# Patient Record
Sex: FEMALE | Race: WHITE | NOT HISPANIC OR LATINO | Employment: PART TIME | URBAN - METROPOLITAN AREA
[De-identification: names, ages, dates, MRNs, and addresses within clinical notes are randomized per-mention and may not be internally consistent; named-entity substitution may affect disease eponyms.]

---

## 2020-12-07 ENCOUNTER — INITIAL PRENATAL (OUTPATIENT)
Dept: OBGYN CLINIC | Facility: CLINIC | Age: 40
End: 2020-12-07

## 2020-12-07 VITALS
WEIGHT: 156 LBS | SYSTOLIC BLOOD PRESSURE: 122 MMHG | RESPIRATION RATE: 16 BRPM | HEART RATE: 88 BPM | DIASTOLIC BLOOD PRESSURE: 70 MMHG | HEIGHT: 65 IN | BODY MASS INDEX: 25.99 KG/M2

## 2020-12-07 DIAGNOSIS — Z34.81 ENCOUNTER FOR SUPERVISION OF OTHER NORMAL PREGNANCY IN FIRST TRIMESTER: Primary | ICD-10-CM

## 2020-12-07 DIAGNOSIS — Z3A.09 9 WEEKS GESTATION OF PREGNANCY: ICD-10-CM

## 2020-12-07 DIAGNOSIS — O09.521 AMA (ADVANCED MATERNAL AGE) MULTIGRAVIDA 35+, FIRST TRIMESTER: ICD-10-CM

## 2020-12-07 PROCEDURE — NOBC: Performed by: OBSTETRICS & GYNECOLOGY

## 2020-12-09 ENCOUNTER — TRANSCRIBE ORDERS (OUTPATIENT)
Dept: LAB | Facility: CLINIC | Age: 40
End: 2020-12-09

## 2020-12-09 ENCOUNTER — LAB (OUTPATIENT)
Dept: LAB | Facility: CLINIC | Age: 40
End: 2020-12-09
Payer: COMMERCIAL

## 2020-12-09 DIAGNOSIS — Z3A.09 9 WEEKS GESTATION OF PREGNANCY: ICD-10-CM

## 2020-12-09 DIAGNOSIS — O09.521 AMA (ADVANCED MATERNAL AGE) MULTIGRAVIDA 35+, FIRST TRIMESTER: ICD-10-CM

## 2020-12-09 DIAGNOSIS — Z34.81 ENCOUNTER FOR SUPERVISION OF OTHER NORMAL PREGNANCY IN FIRST TRIMESTER: ICD-10-CM

## 2020-12-09 LAB
ABO GROUP BLD: NORMAL
BASOPHILS # BLD AUTO: 0.06 THOUSANDS/ΜL (ref 0–0.1)
BASOPHILS NFR BLD AUTO: 1 % (ref 0–1)
BILIRUB UR QL STRIP: NEGATIVE
BLD GP AB SCN SERPL QL: NEGATIVE
CLARITY UR: CLEAR
COLOR UR: YELLOW
EOSINOPHIL # BLD AUTO: 0.09 THOUSAND/ΜL (ref 0–0.61)
EOSINOPHIL NFR BLD AUTO: 1 % (ref 0–6)
ERYTHROCYTE [DISTWIDTH] IN BLOOD BY AUTOMATED COUNT: 13.4 % (ref 11.6–15.1)
EST. AVERAGE GLUCOSE BLD GHB EST-MCNC: 94 MG/DL
GLUCOSE P FAST SERPL-MCNC: 85 MG/DL (ref 65–99)
GLUCOSE UR STRIP-MCNC: NEGATIVE MG/DL
HBA1C MFR BLD: 4.9 %
HBV SURFACE AG SER QL: NORMAL
HCT VFR BLD AUTO: 38.5 % (ref 34.8–46.1)
HCV AB SER QL: NORMAL
HGB BLD-MCNC: 12.6 G/DL (ref 11.5–15.4)
HGB UR QL STRIP.AUTO: NEGATIVE
IMM GRANULOCYTES # BLD AUTO: 0.03 THOUSAND/UL (ref 0–0.2)
IMM GRANULOCYTES NFR BLD AUTO: 0 % (ref 0–2)
KETONES UR STRIP-MCNC: NEGATIVE MG/DL
LEUKOCYTE ESTERASE UR QL STRIP: NEGATIVE
LYMPHOCYTES # BLD AUTO: 1.56 THOUSANDS/ΜL (ref 0.6–4.47)
LYMPHOCYTES NFR BLD AUTO: 21 % (ref 14–44)
MCH RBC QN AUTO: 31.5 PG (ref 26.8–34.3)
MCHC RBC AUTO-ENTMCNC: 32.7 G/DL (ref 31.4–37.4)
MCV RBC AUTO: 96 FL (ref 82–98)
MONOCYTES # BLD AUTO: 0.66 THOUSAND/ΜL (ref 0.17–1.22)
MONOCYTES NFR BLD AUTO: 9 % (ref 4–12)
NEUTROPHILS # BLD AUTO: 5.19 THOUSANDS/ΜL (ref 1.85–7.62)
NEUTS SEG NFR BLD AUTO: 68 % (ref 43–75)
NITRITE UR QL STRIP: NEGATIVE
NRBC BLD AUTO-RTO: 0 /100 WBCS
PH UR STRIP.AUTO: 7 [PH]
PLATELET # BLD AUTO: 338 THOUSANDS/UL (ref 149–390)
PMV BLD AUTO: 8.8 FL (ref 8.9–12.7)
PROT UR STRIP-MCNC: NEGATIVE MG/DL
RBC # BLD AUTO: 4 MILLION/UL (ref 3.81–5.12)
RH BLD: NEGATIVE
RPR SER QL: NORMAL
RUBV IGG SERPL IA-ACNC: 159.3 IU/ML
SP GR UR STRIP.AUTO: 1.02 (ref 1–1.03)
SPECIMEN EXPIRATION DATE: NORMAL
UROBILINOGEN UR QL STRIP.AUTO: 0.2 E.U./DL
WBC # BLD AUTO: 7.59 THOUSAND/UL (ref 4.31–10.16)

## 2020-12-09 PROCEDURE — 82947 ASSAY GLUCOSE BLOOD QUANT: CPT

## 2020-12-09 PROCEDURE — 80081 OBSTETRIC PANEL INC HIV TSTG: CPT

## 2020-12-09 PROCEDURE — 86803 HEPATITIS C AB TEST: CPT

## 2020-12-09 PROCEDURE — 36415 COLL VENOUS BLD VENIPUNCTURE: CPT

## 2020-12-09 PROCEDURE — 86787 VARICELLA-ZOSTER ANTIBODY: CPT

## 2020-12-09 PROCEDURE — 87086 URINE CULTURE/COLONY COUNT: CPT

## 2020-12-09 PROCEDURE — 81003 URINALYSIS AUTO W/O SCOPE: CPT

## 2020-12-09 PROCEDURE — 83036 HEMOGLOBIN GLYCOSYLATED A1C: CPT

## 2020-12-10 LAB
BACTERIA UR CULT: NORMAL
HIV 1+2 AB+HIV1 P24 AG SERPL QL IA: NORMAL
VZV IGG SER IA-ACNC: NORMAL

## 2020-12-11 ENCOUNTER — HOSPITAL ENCOUNTER (OUTPATIENT)
Dept: ULTRASOUND IMAGING | Facility: HOSPITAL | Age: 40
Discharge: HOME | End: 2020-12-11
Attending: OBSTETRICS & GYNECOLOGY
Payer: COMMERCIAL

## 2020-12-11 DIAGNOSIS — O09.521 AMA (ADVANCED MATERNAL AGE) MULTIGRAVIDA 35+, FIRST TRIMESTER: ICD-10-CM

## 2020-12-11 DIAGNOSIS — Z34.81 ENCOUNTER FOR SUPERVISION OF OTHER NORMAL PREGNANCY IN FIRST TRIMESTER: ICD-10-CM

## 2020-12-11 DIAGNOSIS — Z3A.09 9 WEEKS GESTATION OF PREGNANCY: ICD-10-CM

## 2020-12-11 PROCEDURE — 76801 OB US < 14 WKS SINGLE FETUS: CPT

## 2020-12-14 ENCOUNTER — TELEPHONE (OUTPATIENT)
Dept: OBGYN CLINIC | Facility: CLINIC | Age: 40
End: 2020-12-14

## 2020-12-14 DIAGNOSIS — O09.521 AMA (ADVANCED MATERNAL AGE) MULTIGRAVIDA 35+, FIRST TRIMESTER: ICD-10-CM

## 2020-12-14 DIAGNOSIS — Z3A.09 9 WEEKS GESTATION OF PREGNANCY: ICD-10-CM

## 2020-12-14 DIAGNOSIS — Z34.81 ENCOUNTER FOR SUPERVISION OF OTHER NORMAL PREGNANCY IN FIRST TRIMESTER: Primary | ICD-10-CM

## 2020-12-17 ENCOUNTER — TELEPHONE (OUTPATIENT)
Dept: PERINATAL CARE | Facility: CLINIC | Age: 40
End: 2020-12-17

## 2020-12-18 ENCOUNTER — TELEMEDICINE (OUTPATIENT)
Dept: PERINATAL CARE | Facility: CLINIC | Age: 40
End: 2020-12-18

## 2020-12-18 ENCOUNTER — TELEPHONE (OUTPATIENT)
Dept: PERINATAL CARE | Facility: OTHER | Age: 40
End: 2020-12-18

## 2020-12-18 ENCOUNTER — DOCUMENTATION (OUTPATIENT)
Dept: PERINATAL CARE | Facility: OTHER | Age: 40
End: 2020-12-18

## 2020-12-18 ENCOUNTER — TRANSCRIBE ORDERS (OUTPATIENT)
Dept: LAB | Facility: CLINIC | Age: 40
End: 2020-12-18

## 2020-12-18 ENCOUNTER — LAB (OUTPATIENT)
Dept: LAB | Facility: CLINIC | Age: 40
End: 2020-12-18
Payer: COMMERCIAL

## 2020-12-18 DIAGNOSIS — Z31.5 ENCOUNTER FOR PROCREATIVE GENETIC COUNSELING: ICD-10-CM

## 2020-12-18 DIAGNOSIS — O09.521 SUPERVISION OF ELDERLY MULTIGRAVIDA IN FIRST TRIMESTER: ICD-10-CM

## 2020-12-18 DIAGNOSIS — O09.521 SUPERVISION OF ELDERLY MULTIGRAVIDA IN FIRST TRIMESTER: Primary | ICD-10-CM

## 2020-12-18 DIAGNOSIS — O09.529 ANTEPARTUM MULTIGRAVIDA OF ADVANCED MATERNAL AGE: Primary | ICD-10-CM

## 2020-12-18 PROCEDURE — 36415 COLL VENOUS BLD VENIPUNCTURE: CPT

## 2020-12-21 LAB — MISCELLANEOUS LAB TEST RESULT: NORMAL

## 2020-12-22 NOTE — PROGRESS NOTES
Genetic Counseling   High-Risk Gestation Note    Appointment Date:  2020  Referred By: No ref  provider found  YOB: 1980  Partner:  Darren Webb  Indication for Visit:  advanced maternal age  Pregnancy History:   Estimated Date of Delivery: 21  Estimated Gestational Age: 10w1d      Virtual Regular Visit      Assessment/Plan:    Problem List Items Addressed This Visit     None      Visit Diagnoses     Antepartum multigravida of advanced maternal age    -  Primary    Encounter for procreative genetic counseling                   Reason for visit is   Chief Complaint   Patient presents with    Virtual Regular Visit        Encounter provider Anthony Fung    Provider located at [address] [place] [id number] [phone]      Recent Visits  Date Type Provider Dept   20 Telephone [facility]   Showing recent visits within past 7 days and meeting all other requirements     Future Appointments  No visits were found meeting these conditions  Showing future appointments within next 150 days and meeting all other requirements        The patient was identified by name and date of birth  Alisa Burgosmarshall was informed that this is a telemedicine visit and that the visit is being conducted through [facility] and patient was informed that this is a secure, HIPAA-compliant platform  She agrees to proceed     My office door was closed  No one else was in the room  She acknowledged consent and understanding of privacy and security of the video platform  The patient has agreed to participate and understands they can discontinue the visit at any time  Patient is aware this is a billable service  Shady Ramirez is a 45 y o  female who presented for genetic counseling to discuss maternal age related risks for chromosome abnormalities    The risk of Down syndrome at age 45 at delivery is 1/125, and the risk for any chromosomal abnormality at this age is   The differences between full chromosome aneuploidies and copy number variants (microdeletions and microduplications) was also discussed  We reviewed that copy number variants occur in about 0 4% of all pregnancies  The risks, benefits, and limitations of amniocentesis were discussed with the patient  Amniocentesis is performed under direct real time ultrasound visualization to avoid both the fetus and the placenta  Once amniotic fluid is withdrawn, laboratory analysis is performed and amniotic fluid alpha-fetoprotein, as well as chromosome and/or microarray analysis is undertaken  The risk of genetic amniocentesis includes, but is not limited to less than 1 in 300 pregnancy loss rate or  delivery rate if 23 weeks or greater, infection, bleeding, rupture of membranes, failure of cells to grow, karyotype error, laboratory error, etc   Occasionally a repeat amniocentesis is necessary due to cell culture failure  Chromosome/microarray analysis from amniocentesis is 99 9% accurate and alpha-fetoprotein analysis can detect approximately 95% of open neural tube defects  Chorionic villus sampling (CVS) is another diagnostic testing option that is available earlier than amniocentesis, between 10-14 weeks gestation  Like amniocentesis, CVS is 99% accurate for detecting chromosomal problems  Unlike amniocentesis, CVS cannot detect alpha-fetoprotein levels in order to determine the risk for open neural tube defects  MSAFP testing would need to be performed at 15-20 weeks gestation for this purpose  The risk of CVS includes, but is not limited to, less than a 1 in 300 risk for pregnancy loss  There is also a 1% risk for maternal cell contamination and cell culture failure, in which case the CVS would need to be followed-up with amniocentesis      We reviewed the testing option of cell free fetal DNA screening (also known as noninvasive prenatal testing or NIPT)  We discussed that it is a serum test to identify fragments of fetal DNA in maternal blood  We reviewed the benefits and limitations of cell free fetal DNA screening in detecting Down syndrome, Trisomy 13, Trisomy 25 and sex chromosome aneuploidies  We also discussed that cell free fetal DNA screening does not detect additional chromosomal abnormalities and the possibility of a failed test result  As cell free fetal DNA screening does not detect open neural tube defects, MSAFP screening is available at 15-20 weeks gestation  We discussed the availability of an ultrasound between 11-14 weeks gestation to measure the nuchal translucency (NT), which can assess for chromosome abnormalities, cardiac defects, and other adverse pregnancy outcomes  We reviewed that level II anatomy ultrasound is typically performed at approximately 20 weeks gestation  Level II ultrasound evaluation is between 60-80% accurate in detecting major physical birth defects and variations in fetal development that may be associated with chromosome abnormalities  Level II ultrasound evaluation is not able to detect all birth defects or health problems  After discussing the available prenatal screening and testing options Branda Dancer elected to pursue cell free fetal DNA screening  She was informed that the results will disclose the fetal sex and will be available in her MyChart to review  She opted to  a [address] lab slip and kit at our [place] [address] location, to be drawn at the hospital lab  Results take approximately 7-10 days  The [id number] cost estimate information was also provided to the patient and she was encouraged to find out how much it would be prior to getting her blood drawn  The maximum out of pocket cost of $299 through the Every Mom Pledge program was also discussed with the patient  The patient declined CVS and amniocentesis secondary to procedural related complications    She may reconsider diagnostic testing should the cell free fetal DNA screening come back abnormal   Dajuan Gatica is also planning on pursuing NT ultrasound, MSAFP screening and Level II ultrasound at the appropriate times  Histories for the patient and her partner's family were taken during our session and was noncontributory  The family history was not significant for genetic diseases or disorders, intellectual disability, birth defects, fetal loss, or consanguinity  Patient reports being of [place] citizen[place] decent and that her partner is of   decent  She denies either of them having known Ashkenazi Mu-ism ancestry  The benefits and limitations of Cystic fibrosis (CF), Spinal muscular atrophy (SMA), hemoglobinopathy, Fragile X, and expanded carrier screening was discussed  The patient declined expanded carrier screening, but elected to pursue the others pending insurance approval   She will be contacted for her blood draw once approval is obtained  Lastly, we discussed the fact that everyone in the general population regardless of age, family history, or medical background has approximately a 3-5% risk of having a child with some type of congenital anomaly, genetic disease or intellectual disability  Currently there are no tests available to rule out all birth defects or health problems  Dajuan Gatica was provided with our contact information  I encouraged her to call with any questions or concerns  Plan/Tests Ordered:  1) Patient declined CVS, amniocentesis, and expanded carrier screening  2) Patient elected NIPT - will  Broderick Navarro and haylee at [facility]  3) Patient elected CF, SMA, hemoglobinopathy, and Fragile X carrier screening pending insurance approval   4) NT ultrasound scheduled for 1/6/21  5) MSAFP screening at 15-20 weeks gestation  5) Level II anatomy ultrasound at approximately 20 weeks gestation          HPI     Past Medical History:   Diagnosis Date    Abnormal Pap smear of cervix     ADHD     Anxiety     Herpes     HPV (human papilloma virus) infection     Rh incompatibility     Urinary tract infection        Past Surgical History:   Procedure Laterality Date     SECTION      COLPOSCOPY         Current Outpatient Medications   Medication Sig Dispense Refill    Prenatal MV-Min-Fe Fum-FA-DHA (Prenatal Multi +DHA) 27-0 8-200 MG CAPS Take 1 tablet by mouth daily       No current facility-administered medications for this visit  No Known Allergies    Review of Systems    Video Exam    There were no vitals filed for this visit  Physical Exam     I spent 60 minutes with patient today in which greater than 50% of the time was spent in counseling/coordination of care regarding the above  VIRTUAL VISIT DISCLAIMER    Tanner March acknowledges that she has consented to an online visit or consultation  She understands that the online visit is based solely on information provided by her, and that, in the absence of a face-to-face physical evaluation by the physician, the diagnosis she receives is both limited and provisional in terms of accuracy and completeness  This is not intended to replace a full medical face-to-face evaluation by the physician  Tanner Laguna understands and accepts these terms

## 2020-12-24 ENCOUNTER — ROUTINE PRENATAL (OUTPATIENT)
Dept: OBGYN CLINIC | Facility: CLINIC | Age: 40
End: 2020-12-24
Payer: COMMERCIAL

## 2020-12-24 VITALS — SYSTOLIC BLOOD PRESSURE: 118 MMHG | DIASTOLIC BLOOD PRESSURE: 68 MMHG | WEIGHT: 157 LBS | BODY MASS INDEX: 26.13 KG/M2

## 2020-12-24 DIAGNOSIS — Z34.81 ENCOUNTER FOR SUPERVISION OF OTHER NORMAL PREGNANCY IN FIRST TRIMESTER: Primary | ICD-10-CM

## 2020-12-24 DIAGNOSIS — O09.521 MULTIGRAVIDA OF ADVANCED MATERNAL AGE IN FIRST TRIMESTER: ICD-10-CM

## 2020-12-24 DIAGNOSIS — Z11.3 SCREENING FOR STD (SEXUALLY TRANSMITTED DISEASE): ICD-10-CM

## 2020-12-24 DIAGNOSIS — O26.899 RH NEGATIVE STATE IN ANTEPARTUM PERIOD: ICD-10-CM

## 2020-12-24 DIAGNOSIS — L70.9 ACNE, UNSPECIFIED ACNE TYPE: ICD-10-CM

## 2020-12-24 DIAGNOSIS — Z67.91 RH NEGATIVE STATE IN ANTEPARTUM PERIOD: ICD-10-CM

## 2020-12-24 DIAGNOSIS — O34.219 HX SUCCESSFUL VBAC (VAGINAL BIRTH AFTER CESAREAN), CURRENTLY PREGNANT: ICD-10-CM

## 2020-12-24 DIAGNOSIS — O34.219 PATIENT DESIRES VAGINAL BIRTH AFTER CESAREAN SECTION (VBAC): ICD-10-CM

## 2020-12-24 DIAGNOSIS — Z34.81 PRENATAL CARE, SUBSEQUENT PREGNANCY IN FIRST TRIMESTER: ICD-10-CM

## 2020-12-24 PROCEDURE — 99212 OFFICE O/P EST SF 10 MIN: CPT | Performed by: OBSTETRICS & GYNECOLOGY

## 2020-12-24 PROCEDURE — 87491 CHLMYD TRACH DNA AMP PROBE: CPT | Performed by: OBSTETRICS & GYNECOLOGY

## 2020-12-24 PROCEDURE — 87591 N.GONORRHOEAE DNA AMP PROB: CPT | Performed by: OBSTETRICS & GYNECOLOGY

## 2020-12-24 RX ORDER — ERYTHROMYCIN BASE IN ETHANOL 2 %
1 SWAB, MEDICATED TOPICAL DAILY
Qty: 60 EACH | Refills: 11 | Status: SHIPPED | OUTPATIENT
Start: 2020-12-24 | End: 2021-08-06

## 2020-12-25 LAB
C TRACH DNA SPEC QL NAA+PROBE: NEGATIVE
N GONORRHOEA DNA SPEC QL NAA+PROBE: NEGATIVE

## 2021-01-05 ENCOUNTER — TELEPHONE (OUTPATIENT)
Dept: PERINATAL CARE | Facility: CLINIC | Age: 41
End: 2021-01-05

## 2021-01-05 NOTE — TELEPHONE ENCOUNTER
Spoke with patient and confirmed appointment with [facility]  1 support person ( must be over age of 15) may accompany patient  Will you and your support person be able to wear a mask ,without a valve , during entire appointment? YES   To minimize your exposure in our waiting area,check in and rooming questions will be done via phone  When you arrive in the parking lot please call the following inside line # prior to entering office:    Ayana Preciado: [phone]    Have you or your support person traveled outside the state in the last 2 weeks? NO  If yes, what state did you travel to? Do you or your support person have:  Fever or flu- like symptoms? NO  Symptoms of upper respiratory infection like runny nose, sore throat or cough? NO  Do you have new headache that you have not had in the past?NO  Have you experienced any new shortness of breath recently? NO  Do you have any new loss of taste or smell? NO  Do you have any new diarrhea, nausea or vomiting? NO  Have you recently been in contact with anyone who has been sick or diagnosed with COVID-19 infection? NO  Have you been recommended to quarantine because of an exposure to a confirmed positive COVID19 person? NO  Have you recently been tested for COVID19? NO    Patient verbalized understanding of all instructions   -------------------------------------------------------------

## 2021-01-06 ENCOUNTER — ROUTINE PRENATAL (OUTPATIENT)
Dept: PERINATAL CARE | Facility: CLINIC | Age: 41
End: 2021-01-06
Payer: COMMERCIAL

## 2021-01-06 VITALS
WEIGHT: 160.6 LBS | HEART RATE: 90 BPM | DIASTOLIC BLOOD PRESSURE: 72 MMHG | HEIGHT: 65 IN | BODY MASS INDEX: 26.76 KG/M2 | SYSTOLIC BLOOD PRESSURE: 124 MMHG

## 2021-01-06 DIAGNOSIS — Z98.891 HISTORY OF CESAREAN SECTION: ICD-10-CM

## 2021-01-06 DIAGNOSIS — O09.521 ELDERLY MULTIGRAVIDA, FIRST TRIMESTER: Primary | ICD-10-CM

## 2021-01-06 DIAGNOSIS — O34.11 UTERINE FIBROIDS AFFECTING PREGNANCY, ANTEPARTUM, FIRST TRIMESTER: ICD-10-CM

## 2021-01-06 DIAGNOSIS — Z3A.13 13 WEEKS GESTATION OF PREGNANCY: ICD-10-CM

## 2021-01-06 DIAGNOSIS — D25.9 UTERINE FIBROIDS AFFECTING PREGNANCY, ANTEPARTUM, FIRST TRIMESTER: ICD-10-CM

## 2021-01-06 PROCEDURE — 99214 OFFICE O/P EST MOD 30 MIN: CPT | Performed by: OBSTETRICS & GYNECOLOGY

## 2021-01-06 PROCEDURE — 76801 OB US < 14 WKS SINGLE FETUS: CPT | Performed by: OBSTETRICS & GYNECOLOGY

## 2021-01-06 PROCEDURE — 76813 OB US NUCHAL MEAS 1 GEST: CPT | Performed by: OBSTETRICS & GYNECOLOGY

## 2021-01-06 RX ORDER — ASPIRIN 81 MG/1
162 TABLET, CHEWABLE ORAL DAILY
Qty: 180 TABLET | Refills: 1 | Status: SHIPPED | OUTPATIENT
Start: 2021-01-06 | End: 2021-07-08 | Stop reason: HOSPADM

## 2021-01-06 NOTE — LETTER
January 9, 6169     [facility], DO  [address]  [address]  [place] [address]    Patient: Juan Jose Prince   YOB: 1980   Date of Visit: 1/6/2021       Dear Dr Sampson Castellano:    Thank you for referring Aysha Cardenas to me for evaluation  Below are my notes for this consultation  If you have questions, please do not hesitate to call me  I look forward to following your patient along with you  Sincerely,        Bryant Enamorado MD        CC: No Recipients  Bryant Enamorado MD  1/9/2021 10:54 AM  Sign when Signing Visit  Please refer to the [facility] ultrasound report in Ob Procedures for additional information regarding today's visit

## 2021-01-09 PROBLEM — O09.521 ELDERLY MULTIGRAVIDA, FIRST TRIMESTER: Status: ACTIVE | Noted: 2021-01-09

## 2021-01-09 PROBLEM — O34.11 UTERINE FIBROIDS AFFECTING PREGNANCY, ANTEPARTUM, FIRST TRIMESTER: Status: ACTIVE | Noted: 2021-01-09

## 2021-01-09 PROBLEM — D25.9 UTERINE FIBROIDS AFFECTING PREGNANCY, ANTEPARTUM, FIRST TRIMESTER: Status: ACTIVE | Noted: 2021-01-09

## 2021-01-09 PROBLEM — Z98.891 HISTORY OF CESAREAN SECTION: Status: ACTIVE | Noted: 2021-01-09

## 2021-01-09 NOTE — PROGRESS NOTES
Please refer to the [facility] ultrasound report in Ob Procedures for additional information regarding today's visit

## 2021-01-20 ENCOUNTER — ROUTINE PRENATAL (OUTPATIENT)
Dept: OBGYN CLINIC | Facility: CLINIC | Age: 41
End: 2021-01-20
Payer: COMMERCIAL

## 2021-01-20 VITALS — SYSTOLIC BLOOD PRESSURE: 122 MMHG | BODY MASS INDEX: 26.99 KG/M2 | WEIGHT: 162.2 LBS | DIASTOLIC BLOOD PRESSURE: 64 MMHG

## 2021-01-20 DIAGNOSIS — Z34.82 ENCOUNTER FOR SUPERVISION OF OTHER NORMAL PREGNANCY IN SECOND TRIMESTER: Primary | ICD-10-CM

## 2021-01-20 DIAGNOSIS — Z3A.15 15 WEEKS GESTATION OF PREGNANCY: ICD-10-CM

## 2021-01-20 DIAGNOSIS — Z34.81 ENCOUNTER FOR SUPERVISION OF OTHER NORMAL PREGNANCY IN FIRST TRIMESTER: ICD-10-CM

## 2021-01-20 PROBLEM — Z34.90 SUPERVISION OF NORMAL PREGNANCY: Status: ACTIVE | Noted: 2021-01-20

## 2021-01-20 PROBLEM — Z34.92 ENCOUNTER FOR SUPERVISION OF NORMAL PREGNANCY IN SECOND TRIMESTER: Status: ACTIVE | Noted: 2021-01-20

## 2021-01-20 PROCEDURE — 99213 OFFICE O/P EST LOW 20 MIN: CPT | Performed by: OBSTETRICS & GYNECOLOGY

## 2021-01-20 RX ORDER — DOCUSATE SODIUM 250 MG
250 CAPSULE ORAL DAILY
COMMUNITY
End: 2021-08-06

## 2021-01-20 NOTE — PROGRESS NOTES
45 y o  [id number]  female at 16 4 wga EGA for PNV  BP : 122/64  TW  Feeling well  No complaints  AFP ordered  Level 2 scheduled  Prior  section followed by successful  approximately 9 years ago, would like a no other trial of labor  Unsure about contraception    Considering post placental IUD versus sterilization if she were to need a  - will continue to discuss  Follow-up in 4 weeks

## 2021-01-25 ENCOUNTER — DOCUMENTATION (OUTPATIENT)
Dept: PERINATAL CARE | Facility: CLINIC | Age: 41
End: 2021-01-25

## 2021-01-26 ENCOUNTER — TELEPHONE (OUTPATIENT)
Dept: PERINATAL CARE | Facility: CLINIC | Age: 41
End: 2021-01-26

## 2021-01-26 DIAGNOSIS — Z13.71 TESTING OF FEMALE FOR GENETIC DISEASE CARRIER STATUS: Primary | ICD-10-CM

## 2021-01-26 NOTE — TELEPHONE ENCOUNTER
Received notice from patient's insurance that CF, SMA, and Fragile X syndrome carrier screening was approved (Auth # [id number])  Called patient and left message to let her know prior Katelynn Thomas was obtained and she can get her blood drawn at any [address] [place] [address] location  Labslips will be mailed and she will be contacted when results are available  Provided genetic counseling phone number for any questions or concerns

## 2021-02-09 ENCOUNTER — LAB (OUTPATIENT)
Dept: LAB | Facility: AMBULARY SURGERY CENTER | Age: 41
End: 2021-02-09
Attending: OBSTETRICS & GYNECOLOGY
Payer: COMMERCIAL

## 2021-02-09 DIAGNOSIS — Z34.81 ENCOUNTER FOR SUPERVISION OF OTHER NORMAL PREGNANCY IN FIRST TRIMESTER: ICD-10-CM

## 2021-02-09 PROCEDURE — 36415 COLL VENOUS BLD VENIPUNCTURE: CPT

## 2021-02-09 PROCEDURE — 82105 ALPHA-FETOPROTEIN SERUM: CPT

## 2021-02-10 NOTE — RESULT ENCOUNTER NOTE
I have reviewed the labs which are normal in pregnancy  Please contact the patient and notify her of the normal results if she has not reviewed them in Je[facility] Bass     Thank you

## 2021-02-11 LAB
2ND TRIMESTER 4 SCREEN SERPL-IMP: NORMAL
AFP ADJ MOM SERPL: 1.14
AFP INTERP AMN-IMP: NORMAL
AFP INTERP SERPL-IMP: NORMAL
AFP INTERP SERPL-IMP: NORMAL
AFP SERPL-MCNC: 47.8 NG/ML
AGE AT DELIVERY: 39.6 YR
GA METHOD: NORMAL
GA: 18.1 WEEKS
IDDM PATIENT QL: NO
MULTIPLE PREGNANCY: NO
NEURAL TUBE DEFECT RISK FETUS: 7850 %

## 2021-02-12 ENCOUNTER — ROUTINE PRENATAL (OUTPATIENT)
Dept: OBGYN CLINIC | Facility: CLINIC | Age: 41
End: 2021-02-12
Payer: COMMERCIAL

## 2021-02-12 VITALS — BODY MASS INDEX: 26.29 KG/M2 | SYSTOLIC BLOOD PRESSURE: 118 MMHG | DIASTOLIC BLOOD PRESSURE: 64 MMHG | WEIGHT: 158 LBS

## 2021-02-12 DIAGNOSIS — O34.219 PREVIOUS CESAREAN DELIVERY AFFECTING PREGNANCY, ANTEPARTUM: Primary | ICD-10-CM

## 2021-02-12 DIAGNOSIS — Z34.82 ENCOUNTER FOR SUPERVISION OF OTHER NORMAL PREGNANCY IN SECOND TRIMESTER: ICD-10-CM

## 2021-02-12 DIAGNOSIS — Z23 NEED FOR INFLUENZA VACCINATION: ICD-10-CM

## 2021-02-12 DIAGNOSIS — Z3A.18 18 WEEKS GESTATION OF PREGNANCY: ICD-10-CM

## 2021-02-12 PROCEDURE — 90686 IIV4 VACC NO PRSV 0.5 ML IM: CPT | Performed by: OBSTETRICS & GYNECOLOGY

## 2021-02-12 PROCEDURE — 90471 IMMUNIZATION ADMIN: CPT | Performed by: OBSTETRICS & GYNECOLOGY

## 2021-02-12 PROCEDURE — PNV: Performed by: OBSTETRICS & GYNECOLOGY

## 2021-02-12 NOTE — PROGRESS NOTES
45 y o  [id number]  female at 24 2 wga EGA for PNV  BP : 118/64  TWG: 15  Patient is having increased anxiety  She has a past diagnosis of ADHD  She is currently not on medications  She would like to start therapy again  She currently does not have a primary care doctor  Information given to local primary care    Reviewed  labor precautions  Plans to have a trial of labor after   Flu vaccine given today  Follow-up in 4 weeks

## 2021-02-16 LAB
CFTR MUT ANL BLD/T: NORMAL
CFTR MUT ANL BLD/T: NORMAL
CFTR MUT TESTED BLD/T: NORMAL
ERYTHROCYTE [DISTWIDTH] IN BLOOD BY AUTOMATED COUNT: 12 % (ref 11–15)
FMR1 GENE CGG RPT BLD/T QL: NEGATIVE
HCT VFR BLD AUTO: 34.1 % (ref 35–45)
HGB A MFR BLD: 96.5 %
HGB A2 MFR BLD: 2.5 % (ref 1.8–3.5)
HGB BLD-MCNC: 11.6 G/DL (ref 11.7–15.5)
HGB F MFR BLD: <1 %
HGB FRACT BLD-IMP: ABNORMAL
Lab: NORMAL
MCH RBC QN AUTO: 32.9 PG (ref 27–33)
MCV RBC AUTO: 96.6 FL (ref 80–100)
MICRODELETION SYND BLD/T FISH: NORMAL
RBC # BLD AUTO: 3.53 MILLION/UL (ref 3.8–5.1)
REF LAB TEST RESULTS: NORMAL
SL AMB PANEL SUMMARY: NORMAL
SMN1 GENE MUT ANL BLD/T: NORMAL
SMN2 GENE MUT ANL BLD/T: NORMAL

## 2021-02-17 NOTE — RESULT ENCOUNTER NOTE
I have reviewed the labs which are normal in pregnancy  Please contact the patient and notify her of the normal results if she has not reviewed them in Dene[facility] Grace     Thank you

## 2021-02-26 ENCOUNTER — ROUTINE PRENATAL (OUTPATIENT)
Dept: PERINATAL CARE | Facility: OTHER | Age: 41
End: 2021-02-26
Payer: COMMERCIAL

## 2021-02-26 VITALS
DIASTOLIC BLOOD PRESSURE: 70 MMHG | HEART RATE: 88 BPM | WEIGHT: 169.09 LBS | HEIGHT: 65 IN | BODY MASS INDEX: 28.17 KG/M2 | SYSTOLIC BLOOD PRESSURE: 120 MMHG

## 2021-02-26 DIAGNOSIS — Z36.86 ENCOUNTER FOR ANTENATAL SCREENING FOR CERVICAL LENGTH: Primary | ICD-10-CM

## 2021-02-26 DIAGNOSIS — Z3A.20 20 WEEKS GESTATION OF PREGNANCY: ICD-10-CM

## 2021-02-26 DIAGNOSIS — Z36.89 ENCOUNTER FOR FETAL ANATOMIC SURVEY: ICD-10-CM

## 2021-02-26 PROBLEM — O09.522 MULTIGRAVIDA OF ADVANCED MATERNAL AGE IN SECOND TRIMESTER: Status: ACTIVE | Noted: 2020-12-24

## 2021-02-26 PROBLEM — Z98.891 HISTORY OF CESAREAN SECTION: Status: RESOLVED | Noted: 2021-01-09 | Resolved: 2021-02-26

## 2021-02-26 PROBLEM — D25.9 UTERINE FIBROID IN PREGNANCY: Status: ACTIVE | Noted: 2021-02-26

## 2021-02-26 PROBLEM — O34.10 UTERINE FIBROID IN PREGNANCY: Status: ACTIVE | Noted: 2021-02-26

## 2021-02-26 PROBLEM — O09.521 ELDERLY MULTIGRAVIDA, FIRST TRIMESTER: Status: RESOLVED | Noted: 2021-01-09 | Resolved: 2021-02-26

## 2021-02-26 PROCEDURE — 99213 OFFICE O/P EST LOW 20 MIN: CPT | Performed by: OBSTETRICS & GYNECOLOGY

## 2021-02-26 PROCEDURE — 76817 TRANSVAGINAL US OBSTETRIC: CPT | Performed by: OBSTETRICS & GYNECOLOGY

## 2021-02-26 PROCEDURE — 76805 OB US >/= 14 WKS SNGL FETUS: CPT | Performed by: OBSTETRICS & GYNECOLOGY

## 2021-02-26 NOTE — PROGRESS NOTES
[address]: Ms Bob Syed was seen today at 20w4d for anatomic survey and cervical length screening ultrasound  See ultrasound report under "OB Procedures" tab    Please don't hesitate to contact our office with any concerns or questions   -Meagan Khan MD

## 2021-02-26 NOTE — LETTER
2021     DO [address]  [address] [facility]    Patient: Vel Guzmán   YOB: 1980   Date of Visit: 2021       Dear Dr Sienna Cummins:    Thank you for referring Alejo Kirkpatrick to me for evaluation  Below are my notes for this consultation  If you have questions, please do not hesitate to call me  I look forward to following your patient along with you  Sincerely,         US 1 RAUL        CC: No Recipients  Paras Rodriguez MD  2021 10:44 AM  Sign when Signing Visit  [address]: Ms Laure Lombard was seen today at 20w4d for anatomic survey and cervical length screening ultrasound  See ultrasound report under "OB Procedures" tab    Please don't hesitate to contact our office with any concerns or questions   -Paras Rodriguez MD

## 2021-03-10 DIAGNOSIS — Z23 ENCOUNTER FOR IMMUNIZATION: ICD-10-CM

## 2021-03-18 ENCOUNTER — ULTRASOUND (OUTPATIENT)
Dept: PERINATAL CARE | Facility: OTHER | Age: 41
End: 2021-03-18
Payer: COMMERCIAL

## 2021-03-18 ENCOUNTER — ROUTINE PRENATAL (OUTPATIENT)
Dept: OBGYN CLINIC | Facility: CLINIC | Age: 41
End: 2021-03-18

## 2021-03-18 ENCOUNTER — TELEPHONE (OUTPATIENT)
Dept: PERINATAL CARE | Facility: OTHER | Age: 41
End: 2021-03-18

## 2021-03-18 VITALS
SYSTOLIC BLOOD PRESSURE: 122 MMHG | BODY MASS INDEX: 28.92 KG/M2 | HEART RATE: 94 BPM | DIASTOLIC BLOOD PRESSURE: 72 MMHG | WEIGHT: 173.6 LBS | HEIGHT: 65 IN

## 2021-03-18 VITALS — SYSTOLIC BLOOD PRESSURE: 138 MMHG | BODY MASS INDEX: 28.99 KG/M2 | WEIGHT: 174.2 LBS | DIASTOLIC BLOOD PRESSURE: 80 MMHG

## 2021-03-18 DIAGNOSIS — Z3A.23 23 WEEKS GESTATION OF PREGNANCY: Primary | ICD-10-CM

## 2021-03-18 DIAGNOSIS — O09.522 MULTIGRAVIDA OF ADVANCED MATERNAL AGE IN SECOND TRIMESTER: Primary | ICD-10-CM

## 2021-03-18 DIAGNOSIS — Z3A.23 23 WEEKS GESTATION OF PREGNANCY: ICD-10-CM

## 2021-03-18 DIAGNOSIS — O09.522 MULTIGRAVIDA OF ADVANCED MATERNAL AGE IN SECOND TRIMESTER: ICD-10-CM

## 2021-03-18 PROBLEM — Z34.92 ENCOUNTER FOR SUPERVISION OF NORMAL PREGNANCY IN SECOND TRIMESTER: Status: RESOLVED | Noted: 2021-01-20 | Resolved: 2021-03-18

## 2021-03-18 PROCEDURE — 76816 OB US FOLLOW-UP PER FETUS: CPT | Performed by: OBSTETRICS & GYNECOLOGY

## 2021-03-18 PROCEDURE — PNV: Performed by: OBSTETRICS & GYNECOLOGY

## 2021-03-18 PROCEDURE — 99213 OFFICE O/P EST LOW 20 MIN: CPT | Performed by: OBSTETRICS & GYNECOLOGY

## 2021-03-18 NOTE — PROGRESS NOTES
45yo [id number] female at 23w3d EGA presents for a PNV  Initial BP was 144/76 and was repeated for 138/80  TWG 31lbs 3oz  She denies headaches or visual disturbances  She denies cramping, LOF, or vaginal bleeding  She has c/o LUQ abdominal pain x 1 week just above umbilicus with occasional mild nausea  She states nothing makes it worse or better  She also c/o some SOB x 2 weeks, feeling like she cannot fully "expand her lungs"  Cardiac RRR, S1 S2 no murmur  Bilateral lungs CTA b/l  There is no peripheral edema    Abdomen is mildly tender just left of umbilicus  It was recommended to monitor her BP at home and parameters to call office were discussed as well as other s/s to call for  She was also instructed to call office if she develops CP or increasing SOB  She was given lab slip for CBC, RPR and 1 hour Glucose to be completed prior to next visit  She has US scheduled for this afternoon with  for re-evaluation of missed anatomy from previous US    Kayla Spence RN, SNP

## 2021-03-18 NOTE — PROGRESS NOTES
I was present for the exam with Tl Velez and agree with her documented findings  The patient does have a BP cuff at home and we discussed parameters for monitoring, s/sx of preeclampsia  I performed the physical exam as well and had the same findings as documented below  Follow up in 4 weeks

## 2021-03-18 NOTE — PROGRESS NOTES
The patient was seen today for an ultrasound  Please see ultrasound report (located under Ob Procedures) for additional details  Thank you very much for allowing us to participate in the care of this very nice patient  Should you have any questions, please do not hesitate to contact me  Pardeep Hurst MD [id number]  Attending Physician, Junaid

## 2021-04-13 ENCOUNTER — APPOINTMENT (OUTPATIENT)
Dept: LAB | Facility: AMBULARY SURGERY CENTER | Age: 41
End: 2021-04-13
Attending: OBSTETRICS & GYNECOLOGY
Payer: COMMERCIAL

## 2021-04-13 DIAGNOSIS — Z3A.23 23 WEEKS GESTATION OF PREGNANCY: ICD-10-CM

## 2021-04-13 LAB
ABO GROUP BLD: NORMAL
BLD GP AB SCN SERPL QL: NEGATIVE
ERYTHROCYTE [DISTWIDTH] IN BLOOD BY AUTOMATED COUNT: 12.3 % (ref 11.6–15.1)
GLUCOSE 1H P 50 G GLC PO SERPL-MCNC: 36 MG/DL
HCT VFR BLD AUTO: 31.4 % (ref 34.8–46.1)
HGB BLD-MCNC: 10 G/DL (ref 11.5–15.4)
MCH RBC QN AUTO: 31.3 PG (ref 26.8–34.3)
MCHC RBC AUTO-ENTMCNC: 31.8 G/DL (ref 31.4–37.4)
MCV RBC AUTO: 98 FL (ref 82–98)
PLATELET # BLD AUTO: 401 THOUSANDS/UL (ref 149–390)
PMV BLD AUTO: 9.4 FL (ref 8.9–12.7)
RBC # BLD AUTO: 3.2 MILLION/UL (ref 3.81–5.12)
RH BLD: NEGATIVE
SPECIMEN EXPIRATION DATE: NORMAL
WBC # BLD AUTO: 9.8 THOUSAND/UL (ref 4.31–10.16)

## 2021-04-13 PROCEDURE — 86900 BLOOD TYPING SEROLOGIC ABO: CPT

## 2021-04-13 PROCEDURE — 86901 BLOOD TYPING SEROLOGIC RH(D): CPT

## 2021-04-13 PROCEDURE — 86850 RBC ANTIBODY SCREEN: CPT

## 2021-04-13 PROCEDURE — 85027 COMPLETE CBC AUTOMATED: CPT

## 2021-04-13 PROCEDURE — 82950 GLUCOSE TEST: CPT

## 2021-04-13 PROCEDURE — 86592 SYPHILIS TEST NON-TREP QUAL: CPT

## 2021-04-13 PROCEDURE — 36415 COLL VENOUS BLD VENIPUNCTURE: CPT

## 2021-04-14 LAB — RPR SER QL: NORMAL

## 2021-04-15 ENCOUNTER — ROUTINE PRENATAL (OUTPATIENT)
Dept: OBGYN CLINIC | Facility: CLINIC | Age: 41
End: 2021-04-15
Payer: COMMERCIAL

## 2021-04-15 VITALS — WEIGHT: 175 LBS | DIASTOLIC BLOOD PRESSURE: 68 MMHG | BODY MASS INDEX: 29.12 KG/M2 | SYSTOLIC BLOOD PRESSURE: 128 MMHG

## 2021-04-15 DIAGNOSIS — O09.523 MULTIGRAVIDA OF ADVANCED MATERNAL AGE IN THIRD TRIMESTER: Primary | ICD-10-CM

## 2021-04-15 DIAGNOSIS — Z3A.28 28 WEEKS GESTATION OF PREGNANCY: ICD-10-CM

## 2021-04-15 DIAGNOSIS — Z23 NEED FOR TDAP VACCINATION: ICD-10-CM

## 2021-04-15 DIAGNOSIS — Z29.13 NEED FOR RHOGAM DUE TO RH NEGATIVE MOTHER: ICD-10-CM

## 2021-04-15 PROBLEM — O34.10 UTERINE FIBROID IN PREGNANCY: Status: RESOLVED | Noted: 2021-02-26 | Resolved: 2021-04-15

## 2021-04-15 PROBLEM — D25.9 UTERINE FIBROID IN PREGNANCY: Status: RESOLVED | Noted: 2021-02-26 | Resolved: 2021-04-15

## 2021-04-15 PROCEDURE — 90384 RH IG FULL-DOSE IM: CPT | Performed by: OBSTETRICS & GYNECOLOGY

## 2021-04-15 PROCEDURE — 99214 OFFICE O/P EST MOD 30 MIN: CPT | Performed by: OBSTETRICS & GYNECOLOGY

## 2021-04-15 PROCEDURE — 90715 TDAP VACCINE 7 YRS/> IM: CPT | Performed by: OBSTETRICS & GYNECOLOGY

## 2021-04-15 PROCEDURE — 90471 IMMUNIZATION ADMIN: CPT | Performed by: OBSTETRICS & GYNECOLOGY

## 2021-04-15 NOTE — PROGRESS NOTES
Red folder due today  Pt has no complaints   Expresses interest in Tdap vaccine, given today   Due for Rhogam, given today  Expresses interest in breast pump, ordered online today, pt aware   Urine dip neg/neg

## 2021-04-15 NOTE — PROGRESS NOTES
45 y o    female at 31 1 wga EGA for PNV  BP : 128/68  TW  Feeling well  No complaints    Glucola, RPR and CBC reviewed today  RhoGam needed Yes -givne  Tdap needed Yes - given  FKC reviewed  28 week booklet, Baby and Me and birth plan given and reviewed today     Consent signed, full code, ok with blood transfusion  Delayed cord clamping, skin to skin, rooming in and breast feeding reviewed    F/u 2 weeks

## 2021-05-03 ENCOUNTER — ULTRASOUND (OUTPATIENT)
Dept: PERINATAL CARE | Facility: OTHER | Age: 41
End: 2021-05-03
Payer: COMMERCIAL

## 2021-05-03 DIAGNOSIS — D25.9 UTERINE FIBROIDS AFFECTING PREGNANCY IN THIRD TRIMESTER: ICD-10-CM

## 2021-05-03 DIAGNOSIS — O34.13 UTERINE FIBROIDS AFFECTING PREGNANCY IN THIRD TRIMESTER: ICD-10-CM

## 2021-05-03 DIAGNOSIS — Z3A.30 30 WEEKS GESTATION OF PREGNANCY: ICD-10-CM

## 2021-05-03 DIAGNOSIS — O09.523 MULTIGRAVIDA OF ADVANCED MATERNAL AGE IN THIRD TRIMESTER: Primary | ICD-10-CM

## 2021-05-03 PROCEDURE — 76816 OB US FOLLOW-UP PER FETUS: CPT | Performed by: OBSTETRICS & GYNECOLOGY

## 2021-05-03 PROCEDURE — 99212 OFFICE O/P EST SF 10 MIN: CPT | Performed by: OBSTETRICS & GYNECOLOGY

## 2021-05-03 NOTE — LETTER
May 4, 2021     [address]  [facility]    Patient: Francisco Diaz   YOB: 1980   Date of Visit: 5/3/2021       Dear Dr Aria Lewis: Thank you for referring Sandi Wilson to me for evaluation  Below are my notes for this consultation  If you have questions, please do not hesitate to call me  I look forward to following your patient along with you  Sincerely,        Petty Garcia MD        CC: No Recipients  Petty Garcia MD  2021  5:03 PM  Sign when Signing Visit  Ob ultrasound and MFM follow up    Ms Adriana Lucero is a 44 yo  patient  AMA  Uterine fibroids  Fetal ultrasound at 30 0/7 weeks gestation  to evaluate growth  See Ob procedures in EPIC  Ultrasound:      1  Fetal size = dates  EFW= 3lb 8 oz  = 54%      2  No fetal anomalies observed  3  Normal PO  17 9  3  Uterine fibroid stable  I reviewed the results of this ultrasound and answered  all the questions  Recommendations:      1  Follow-up ultrasound as clinically indicated      The total time spent on this established patient on the encounter date was 15 minutes  This time included previsit service time of 5 minutes, face-to-face  service time of  5 minutes discussing the results of the ultrasound, medical history, findings and recomendations, and post-service time of 5 minutes  Thank you for referring your patient to our offices  The limitations of ultrasound to detect all anomalies was reviewed and how it is not  a test to rule out aneuploidy  If you have any further questions do not hesitate to contact us as [phone]       Petty Garcia MD

## 2021-05-04 PROBLEM — O34.13 UTERINE FIBROIDS AFFECTING PREGNANCY IN THIRD TRIMESTER: Status: ACTIVE | Noted: 2021-01-09

## 2021-05-04 PROBLEM — Z3A.30 30 WEEKS GESTATION OF PREGNANCY: Status: ACTIVE | Noted: 2021-01-20

## 2021-05-04 NOTE — PROGRESS NOTES
Ob ultrasound and MFM follow up    Ms Bryson Arvizu is a 44 yo  patient  AMA  Uterine fibroids  Fetal ultrasound at 30 0/7 weeks gestation  to evaluate growth  See Ob procedures in EPIC  Ultrasound:      1  Fetal size = dates  EFW= 3lb 8 oz  = 54%      2  No fetal anomalies observed  3  Normal PO  17 9  3  Uterine fibroid stable  I reviewed the results of this ultrasound and answered  all the questions  Recommendations:      1  Follow-up ultrasound as clinically indicated      The total time spent on this established patient on the encounter date was 15 minutes  This time included previsit service time of 5 minutes, face-to-face  service time of  5 minutes discussing the results of the ultrasound, medical history, findings and recomendations, and post-service time of 5 minutes  Thank you for referring your patient to our offices  The limitations of ultrasound to detect all anomalies was reviewed and how it is not  a test to rule out aneuploidy  If you have any further questions do not hesitate to contact us as [phone]       Alen Frye MD

## 2021-05-13 ENCOUNTER — ROUTINE PRENATAL (OUTPATIENT)
Dept: OBGYN CLINIC | Facility: CLINIC | Age: 41
End: 2021-05-13

## 2021-05-13 VITALS — SYSTOLIC BLOOD PRESSURE: 122 MMHG | WEIGHT: 178 LBS | BODY MASS INDEX: 29.62 KG/M2 | DIASTOLIC BLOOD PRESSURE: 64 MMHG

## 2021-05-13 DIAGNOSIS — Z67.91 RH NEGATIVE STATE IN ANTEPARTUM PERIOD: ICD-10-CM

## 2021-05-13 DIAGNOSIS — O34.13 UTERINE FIBROIDS AFFECTING PREGNANCY IN THIRD TRIMESTER: ICD-10-CM

## 2021-05-13 DIAGNOSIS — O09.523 MULTIGRAVIDA OF ADVANCED MATERNAL AGE IN THIRD TRIMESTER: ICD-10-CM

## 2021-05-13 DIAGNOSIS — O34.219 PATIENT DESIRES VAGINAL BIRTH AFTER CESAREAN SECTION (VBAC): ICD-10-CM

## 2021-05-13 DIAGNOSIS — O26.899 RH NEGATIVE STATE IN ANTEPARTUM PERIOD: ICD-10-CM

## 2021-05-13 DIAGNOSIS — D25.9 UTERINE FIBROIDS AFFECTING PREGNANCY IN THIRD TRIMESTER: ICD-10-CM

## 2021-05-13 DIAGNOSIS — Z3A.31 31 WEEKS GESTATION OF PREGNANCY: Primary | ICD-10-CM

## 2021-05-13 DIAGNOSIS — O34.219 PREVIOUS CESAREAN DELIVERY AFFECTING PREGNANCY, ANTEPARTUM: ICD-10-CM

## 2021-05-13 DIAGNOSIS — O34.219 HX SUCCESSFUL VBAC (VAGINAL BIRTH AFTER CESAREAN), CURRENTLY PREGNANT: ICD-10-CM

## 2021-05-13 PROCEDURE — PNV: Performed by: OBSTETRICS & GYNECOLOGY

## 2021-05-13 RX ORDER — LORATADINE 10 MG/1
10 TABLET ORAL DAILY
Status: ON HOLD | COMMUNITY
End: 2021-07-06 | Stop reason: ALTCHOICE

## 2021-05-13 NOTE — PROGRESS NOTES
45 y o  [id number]  female at 34 4 wga EGA for PNV  BP : 122/64  TWlb    Patient presents for return OB visit  Feeling well and has no complaints today  Denies contractions, vaginal bleeding, and LOF  Reports daily FM  Patient struggling with allergies and dry bloody noses  Reviewed recommendations for anti-histamine, pt would like to try xyzal, and use of saline nasal spray and vaseline to help moisturize nares  [facility] US at 30wks -- EFW 3lb 8oz, 54%, fibroid stable  No further US clinically necessary  Prior c/s followed by successful   Desires TOLAC  Rh negative -- s/p Rhogam administered 4/15/21  PTL precautions reviewed  Encouraged continued [address]  Follow up in 2 weeks

## 2021-05-27 ENCOUNTER — ROUTINE PRENATAL (OUTPATIENT)
Dept: OBGYN CLINIC | Facility: CLINIC | Age: 41
End: 2021-05-27

## 2021-05-27 VITALS — BODY MASS INDEX: 29.99 KG/M2 | WEIGHT: 180.2 LBS | SYSTOLIC BLOOD PRESSURE: 120 MMHG | DIASTOLIC BLOOD PRESSURE: 68 MMHG

## 2021-05-27 DIAGNOSIS — O09.523 MULTIGRAVIDA OF ADVANCED MATERNAL AGE IN THIRD TRIMESTER: ICD-10-CM

## 2021-05-27 DIAGNOSIS — O34.13 UTERINE FIBROIDS AFFECTING PREGNANCY IN THIRD TRIMESTER: ICD-10-CM

## 2021-05-27 DIAGNOSIS — D25.9 UTERINE FIBROIDS AFFECTING PREGNANCY IN THIRD TRIMESTER: ICD-10-CM

## 2021-05-27 DIAGNOSIS — Z3A.33 33 WEEKS GESTATION OF PREGNANCY: ICD-10-CM

## 2021-05-27 PROCEDURE — PNV: Performed by: OBSTETRICS & GYNECOLOGY

## 2021-05-27 RX ORDER — LEVOCETIRIZINE DIHYDROCHLORIDE 5 MG/1
5 TABLET, FILM COATED ORAL EVERY EVENING
COMMUNITY

## 2021-05-27 NOTE — PROGRESS NOTES
This is a 45 y o  [id number] at 33w3d who presents for return OB visit  No complaints  Denies contractions, leakage, bleeding   Endorses fetal movement   BP: 120/68 TWlb   Prior C/s, planning TOLAC  F/up 2 wks

## 2021-06-10 ENCOUNTER — ROUTINE PRENATAL (OUTPATIENT)
Dept: OBGYN CLINIC | Facility: CLINIC | Age: 41
End: 2021-06-10

## 2021-06-10 VITALS — WEIGHT: 185.2 LBS | SYSTOLIC BLOOD PRESSURE: 128 MMHG | DIASTOLIC BLOOD PRESSURE: 78 MMHG | BODY MASS INDEX: 30.82 KG/M2

## 2021-06-10 DIAGNOSIS — O34.219 PATIENT DESIRES VAGINAL BIRTH AFTER CESAREAN SECTION (VBAC): ICD-10-CM

## 2021-06-10 DIAGNOSIS — O09.523 MULTIGRAVIDA OF ADVANCED MATERNAL AGE IN THIRD TRIMESTER: ICD-10-CM

## 2021-06-10 DIAGNOSIS — O34.219 HX SUCCESSFUL VBAC (VAGINAL BIRTH AFTER CESAREAN), CURRENTLY PREGNANT: ICD-10-CM

## 2021-06-10 DIAGNOSIS — Z3A.35 35 WEEKS GESTATION OF PREGNANCY: ICD-10-CM

## 2021-06-10 DIAGNOSIS — O26.899 RH NEGATIVE STATE IN ANTEPARTUM PERIOD: ICD-10-CM

## 2021-06-10 DIAGNOSIS — Z67.91 RH NEGATIVE STATE IN ANTEPARTUM PERIOD: ICD-10-CM

## 2021-06-10 DIAGNOSIS — O34.219 PREVIOUS CESAREAN DELIVERY AFFECTING PREGNANCY, ANTEPARTUM: Primary | ICD-10-CM

## 2021-06-10 PROCEDURE — PNV: Performed by: OBSTETRICS & GYNECOLOGY

## 2021-06-10 NOTE — PROGRESS NOTES
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

## 2021-06-15 ENCOUNTER — ROUTINE PRENATAL (OUTPATIENT)
Dept: OBGYN CLINIC | Facility: CLINIC | Age: 41
End: 2021-06-15

## 2021-06-15 VITALS — WEIGHT: 185 LBS | SYSTOLIC BLOOD PRESSURE: 122 MMHG | BODY MASS INDEX: 30.79 KG/M2 | DIASTOLIC BLOOD PRESSURE: 72 MMHG

## 2021-06-15 DIAGNOSIS — Z3A.36 36 WEEKS GESTATION OF PREGNANCY: ICD-10-CM

## 2021-06-15 DIAGNOSIS — O09.523 MULTIGRAVIDA OF ADVANCED MATERNAL AGE IN THIRD TRIMESTER: Primary | ICD-10-CM

## 2021-06-15 DIAGNOSIS — O34.13 UTERINE FIBROIDS AFFECTING PREGNANCY IN THIRD TRIMESTER: ICD-10-CM

## 2021-06-15 DIAGNOSIS — D25.9 UTERINE FIBROIDS AFFECTING PREGNANCY IN THIRD TRIMESTER: ICD-10-CM

## 2021-06-15 PROCEDURE — PNV: Performed by: PHYSICIAN ASSISTANT

## 2021-06-15 PROCEDURE — 87150 DNA/RNA AMPLIFIED PROBE: CPT | Performed by: PHYSICIAN ASSISTANT

## 2021-06-15 NOTE — PROGRESS NOTES
Patient is a 46 YO [id number] female presenting to the office at 36 1 weeks for routine OB care  BP: 122/72  TWlb  Fetal Movement: yes good movement  Feeling well today  Denies LOF, CtX, vag bleeding, HA, blurry vision  Reviewed when to call in labor   GBS collected  All questions answered  Patient to call for concerns  RTO 1 week

## 2021-06-18 LAB — GP B STREP DNA SPEC QL NAA+PROBE: NEGATIVE

## 2021-06-22 ENCOUNTER — ROUTINE PRENATAL (OUTPATIENT)
Dept: OBGYN CLINIC | Facility: CLINIC | Age: 41
End: 2021-06-22

## 2021-06-22 VITALS — SYSTOLIC BLOOD PRESSURE: 124 MMHG | WEIGHT: 187 LBS | BODY MASS INDEX: 31.12 KG/M2 | DIASTOLIC BLOOD PRESSURE: 72 MMHG

## 2021-06-22 DIAGNOSIS — O34.219 HX SUCCESSFUL VBAC (VAGINAL BIRTH AFTER CESAREAN), CURRENTLY PREGNANT: Primary | ICD-10-CM

## 2021-06-22 DIAGNOSIS — O09.523 MULTIGRAVIDA OF ADVANCED MATERNAL AGE IN THIRD TRIMESTER: ICD-10-CM

## 2021-06-22 DIAGNOSIS — D25.9 UTERINE FIBROIDS AFFECTING PREGNANCY IN THIRD TRIMESTER: ICD-10-CM

## 2021-06-22 DIAGNOSIS — O34.13 UTERINE FIBROIDS AFFECTING PREGNANCY IN THIRD TRIMESTER: ICD-10-CM

## 2021-06-22 DIAGNOSIS — Z3A.37 37 WEEKS GESTATION OF PREGNANCY: ICD-10-CM

## 2021-06-22 PROCEDURE — PNV: Performed by: PHYSICIAN ASSISTANT

## 2021-06-22 NOTE — PROGRESS NOTES
Patient is a 46 YO [id number] female presenting to the office at 37 1 weeks for routine OB care  BP: 124/72  TWlb  Fetal Movement: yes good movement  Reports occasional CTX, not timeable or consistent  Denies LOF, vag bleeding  Reviewed labor precautions 5-1-1- rule  Patient desires IOL @ 39 weeks, prior c/s and prior   Call for concerns  RTO 1 week

## 2021-06-29 ENCOUNTER — ROUTINE PRENATAL (OUTPATIENT)
Dept: OBGYN CLINIC | Facility: CLINIC | Age: 41
End: 2021-06-29

## 2021-06-29 VITALS — BODY MASS INDEX: 31.12 KG/M2 | SYSTOLIC BLOOD PRESSURE: 122 MMHG | DIASTOLIC BLOOD PRESSURE: 78 MMHG | WEIGHT: 187 LBS

## 2021-06-29 DIAGNOSIS — D25.9 UTERINE FIBROIDS AFFECTING PREGNANCY IN THIRD TRIMESTER: ICD-10-CM

## 2021-06-29 DIAGNOSIS — O26.899 RH NEGATIVE STATE IN ANTEPARTUM PERIOD: ICD-10-CM

## 2021-06-29 DIAGNOSIS — O34.219 PREVIOUS CESAREAN DELIVERY AFFECTING PREGNANCY, ANTEPARTUM: ICD-10-CM

## 2021-06-29 DIAGNOSIS — O34.13 UTERINE FIBROIDS AFFECTING PREGNANCY IN THIRD TRIMESTER: ICD-10-CM

## 2021-06-29 DIAGNOSIS — O09.523 MULTIGRAVIDA OF ADVANCED MATERNAL AGE IN THIRD TRIMESTER: ICD-10-CM

## 2021-06-29 DIAGNOSIS — Z3A.38 38 WEEKS GESTATION OF PREGNANCY: ICD-10-CM

## 2021-06-29 DIAGNOSIS — Z67.91 RH NEGATIVE STATE IN ANTEPARTUM PERIOD: ICD-10-CM

## 2021-06-29 DIAGNOSIS — O34.219 PATIENT DESIRES VAGINAL BIRTH AFTER CESAREAN SECTION (VBAC): ICD-10-CM

## 2021-06-29 DIAGNOSIS — O34.219 HX SUCCESSFUL VBAC (VAGINAL BIRTH AFTER CESAREAN), CURRENTLY PREGNANT: Primary | ICD-10-CM

## 2021-06-29 PROCEDURE — PNV: Performed by: PHYSICIAN ASSISTANT

## 2021-06-29 NOTE — PROGRESS NOTES
Pt has no complaints or concerns a this time  Pt states she is having BH contractions she states she does have some stabbing pain but denies any pelvic pressure or fluid leakage  Pt would like a cervical check today   Urine dip positive trace protein/negative glucose

## 2021-06-29 NOTE — PROGRESS NOTES
Patient is a 46 YO [id number] female presenting to the office at 38 1 weeks for routine OB care  BP: 122/78  TWlb  Fetal Movement: yes good movement  Feeling well today  Reports occasional ctx, but not consistent  Denies LOF, vag bleeding  Patient is a prior c/s x 1 with prior successful  x 1, she desires IOL at 39 weeks  Cervix 1 /-3  Reviewed cervical ripening prior to IOL  Scheduled for THAPA @ 8PM on , PIT/AROM  Reviewed labor precautions, 5-1-1 rule  Present to L&D for IOL

## 2021-07-06 ENCOUNTER — HOSPITAL ENCOUNTER (OUTPATIENT)
Dept: LABOR AND DELIVERY | Facility: HOSPITAL | Age: 41
Discharge: HOME | DRG: 560 | End: 2021-07-06
Payer: COMMERCIAL

## 2021-07-06 ENCOUNTER — HOSPITAL ENCOUNTER (INPATIENT)
Facility: HOSPITAL | Age: 41
LOS: 2 days | Discharge: HOME | DRG: 560 | End: 2021-07-08
Attending: OBSTETRICS & GYNECOLOGY | Admitting: OBSTETRICS & GYNECOLOGY
Payer: COMMERCIAL

## 2021-07-06 DIAGNOSIS — O34.219 VBAC, DELIVERED: ICD-10-CM

## 2021-07-06 DIAGNOSIS — Z3A.39 39 WEEKS GESTATION OF PREGNANCY: Primary | ICD-10-CM

## 2021-07-06 DIAGNOSIS — O34.219 PREVIOUS CESAREAN DELIVERY AFFECTING PREGNANCY, ANTEPARTUM: ICD-10-CM

## 2021-07-06 DIAGNOSIS — O34.219 HX SUCCESSFUL VBAC (VAGINAL BIRTH AFTER CESAREAN), CURRENTLY PREGNANT: ICD-10-CM

## 2021-07-06 LAB
BASOPHILS # BLD AUTO: 0.07 THOUSANDS/ΜL (ref 0–0.1)
BASOPHILS NFR BLD AUTO: 0 % (ref 0–1)
EOSINOPHIL # BLD AUTO: 0.07 THOUSAND/ΜL (ref 0–0.61)
EOSINOPHIL NFR BLD AUTO: 0 % (ref 0–6)
ERYTHROCYTE [DISTWIDTH] IN BLOOD BY AUTOMATED COUNT: 14.5 % (ref 11.6–15.1)
HCT VFR BLD AUTO: 30.3 % (ref 34.8–46.1)
HGB BLD-MCNC: 9.5 G/DL (ref 11.5–15.4)
IMM GRANULOCYTES # BLD AUTO: 0.14 THOUSAND/UL (ref 0–0.2)
IMM GRANULOCYTES NFR BLD AUTO: 1 % (ref 0–2)
LYMPHOCYTES # BLD AUTO: 1.36 THOUSANDS/ΜL (ref 0.6–4.47)
LYMPHOCYTES NFR BLD AUTO: 8 % (ref 14–44)
MCH RBC QN AUTO: 26.5 PG (ref 26.8–34.3)
MCHC RBC AUTO-ENTMCNC: 31.4 G/DL (ref 31.4–37.4)
MCV RBC AUTO: 85 FL (ref 82–98)
MONOCYTES # BLD AUTO: 1.27 THOUSAND/ΜL (ref 0.17–1.22)
MONOCYTES NFR BLD AUTO: 8 % (ref 4–12)
NEUTROPHILS # BLD AUTO: 14.01 THOUSANDS/ΜL (ref 1.85–7.62)
NEUTS SEG NFR BLD AUTO: 83 % (ref 43–75)
NRBC BLD AUTO-RTO: 0 /100 WBCS
PLATELET # BLD AUTO: 416 THOUSANDS/UL (ref 149–390)
PMV BLD AUTO: 9.8 FL (ref 8.9–12.7)
RBC # BLD AUTO: 3.58 MILLION/UL (ref 3.81–5.12)
WBC # BLD AUTO: 16.92 THOUSAND/UL (ref 4.31–10.16)

## 2021-07-06 PROCEDURE — 86592 SYPHILIS TEST NON-TREP QUAL: CPT

## 2021-07-06 PROCEDURE — NC001 PR NO CHARGE: Performed by: OBSTETRICS & GYNECOLOGY

## 2021-07-06 PROCEDURE — 86850 RBC ANTIBODY SCREEN: CPT

## 2021-07-06 PROCEDURE — 86900 BLOOD TYPING SEROLOGIC ABO: CPT

## 2021-07-06 PROCEDURE — 10907ZC DRAINAGE OF AMNIOTIC FLUID, THERAPEUTIC FROM PRODUCTS OF CONCEPTION, VIA NATURAL OR ARTIFICIAL OPENING: ICD-10-PCS | Performed by: OBSTETRICS & GYNECOLOGY

## 2021-07-06 PROCEDURE — 86901 BLOOD TYPING SEROLOGIC RH(D): CPT

## 2021-07-06 PROCEDURE — 4A1HXCZ MONITORING OF PRODUCTS OF CONCEPTION, CARDIAC RATE, EXTERNAL APPROACH: ICD-10-PCS | Performed by: OBSTETRICS & GYNECOLOGY

## 2021-07-06 PROCEDURE — 3E033VJ INTRODUCTION OF OTHER HORMONE INTO PERIPHERAL VEIN, PERCUTANEOUS APPROACH: ICD-10-PCS | Performed by: OBSTETRICS & GYNECOLOGY

## 2021-07-06 PROCEDURE — 85025 COMPLETE CBC W/AUTO DIFF WBC: CPT

## 2021-07-06 RX ORDER — SODIUM CHLORIDE, SODIUM LACTATE, POTASSIUM CHLORIDE, CALCIUM CHLORIDE 600; 310; 30; 20 MG/100ML; MG/100ML; MG/100ML; MG/100ML
125 INJECTION, SOLUTION INTRAVENOUS CONTINUOUS
Status: DISCONTINUED | OUTPATIENT
Start: 2021-07-06 | End: 2021-07-07

## 2021-07-06 RX ORDER — OXYTOCIN/RINGER'S LACTATE 30/500 ML
1-30 PLASTIC BAG, INJECTION (ML) INTRAVENOUS
Status: DISCONTINUED | OUTPATIENT
Start: 2021-07-06 | End: 2021-07-07

## 2021-07-06 RX ORDER — CALCIUM CARBONATE 200(500)MG
1000 TABLET,CHEWABLE ORAL DAILY PRN
Status: DISCONTINUED | OUTPATIENT
Start: 2021-07-06 | End: 2021-07-07

## 2021-07-06 RX ORDER — ONDANSETRON 2 MG/ML
4 INJECTION INTRAMUSCULAR; INTRAVENOUS EVERY 8 HOURS PRN
Status: DISCONTINUED | OUTPATIENT
Start: 2021-07-06 | End: 2021-07-07

## 2021-07-06 RX ADMIN — SODIUM CHLORIDE, SODIUM LACTATE, POTASSIUM CHLORIDE, AND CALCIUM CHLORIDE 125 ML/HR: .6; .31; .03; .02 INJECTION, SOLUTION INTRAVENOUS at 22:35

## 2021-07-06 RX ADMIN — Medication 2 MILLI-UNITS/MIN: at 22:35

## 2021-07-07 ENCOUNTER — ANESTHESIA (INPATIENT)
Dept: ANESTHESIOLOGY | Facility: HOSPITAL | Age: 41
DRG: 560 | End: 2021-07-07
Payer: COMMERCIAL

## 2021-07-07 ENCOUNTER — ANESTHESIA EVENT (INPATIENT)
Dept: ANESTHESIOLOGY | Facility: HOSPITAL | Age: 41
DRG: 560 | End: 2021-07-07
Payer: COMMERCIAL

## 2021-07-07 LAB
ABO GROUP BLD: NORMAL
BASE EXCESS BLDCOA CALC-SCNC: -5.4 MMOL/L (ref 3–11)
BASE EXCESS BLDCOV CALC-SCNC: -5.3 MMOL/L (ref 1–9)
BLD GP AB SCN SERPL QL: NEGATIVE
HCO3 BLDCOA-SCNC: 20 MMOL/L (ref 17.3–27.3)
HCO3 BLDCOV-SCNC: 20.6 MMOL/L (ref 12.2–28.6)
O2 CT VFR BLDCOA CALC: 11.9 ML/DL
OXYHGB MFR BLDCOA: 65.1 %
OXYHGB MFR BLDCOV: 64.3 %
PCO2 BLDCOA: 38.4 MM[HG] (ref 30–60)
PCO2 BLDCOV: 41.7 MM HG (ref 27–43)
PH BLDCOA: 7.33 [PH] (ref 7.23–7.43)
PH BLDCOV: 7.31 [PH] (ref 7.19–7.49)
PO2 BLDCOA: 28.7 MM HG (ref 5–25)
PO2 BLDCOV: 30.6 MM HG (ref 15–45)
RH BLD: NEGATIVE
RPR SER QL: NORMAL
SAO2 % BLDCOV: 12 ML/DL
SPECIMEN EXPIRATION DATE: NORMAL

## 2021-07-07 PROCEDURE — 82805 BLOOD GASES W/O2 SATURATION: CPT | Performed by: OBSTETRICS & GYNECOLOGY

## 2021-07-07 RX ORDER — SIMETHICONE 80 MG
80 TABLET,CHEWABLE ORAL 4 TIMES DAILY PRN
Status: DISCONTINUED | OUTPATIENT
Start: 2021-07-07 | End: 2021-07-08 | Stop reason: HOSPADM

## 2021-07-07 RX ORDER — IBUPROFEN 600 MG/1
600 TABLET ORAL EVERY 6 HOURS PRN
Status: DISCONTINUED | OUTPATIENT
Start: 2021-07-07 | End: 2021-07-08 | Stop reason: HOSPADM

## 2021-07-07 RX ORDER — ONDANSETRON 2 MG/ML
4 INJECTION INTRAMUSCULAR; INTRAVENOUS EVERY 8 HOURS PRN
Status: DISCONTINUED | OUTPATIENT
Start: 2021-07-07 | End: 2021-07-08 | Stop reason: HOSPADM

## 2021-07-07 RX ORDER — OXYTOCIN/RINGER'S LACTATE 30/500 ML
1-30 PLASTIC BAG, INJECTION (ML) INTRAVENOUS CONTINUOUS
Status: DISCONTINUED | OUTPATIENT
Start: 2021-07-07 | End: 2021-07-08 | Stop reason: HOSPADM

## 2021-07-07 RX ORDER — DIPHENHYDRAMINE HCL 25 MG
25 TABLET ORAL EVERY 6 HOURS PRN
Status: DISCONTINUED | OUTPATIENT
Start: 2021-07-07 | End: 2021-07-08 | Stop reason: HOSPADM

## 2021-07-07 RX ORDER — CALCIUM CARBONATE 200(500)MG
1000 TABLET,CHEWABLE ORAL DAILY PRN
Status: DISCONTINUED | OUTPATIENT
Start: 2021-07-07 | End: 2021-07-08 | Stop reason: HOSPADM

## 2021-07-07 RX ORDER — LIDOCAINE HYDROCHLORIDE AND EPINEPHRINE 15; 5 MG/ML; UG/ML
INJECTION, SOLUTION EPIDURAL
Status: COMPLETED | OUTPATIENT
Start: 2021-07-07 | End: 2021-07-07

## 2021-07-07 RX ORDER — ROPIVACAINE HYDROCHLORIDE 2 MG/ML
INJECTION, SOLUTION EPIDURAL; INFILTRATION; PERINEURAL AS NEEDED
Status: DISCONTINUED | OUTPATIENT
Start: 2021-07-07 | End: 2021-07-07 | Stop reason: HOSPADM

## 2021-07-07 RX ORDER — DOCUSATE SODIUM 100 MG/1
100 CAPSULE, LIQUID FILLED ORAL 2 TIMES DAILY PRN
Status: DISCONTINUED | OUTPATIENT
Start: 2021-07-07 | End: 2021-07-08 | Stop reason: HOSPADM

## 2021-07-07 RX ORDER — ACETAMINOPHEN 325 MG/1
650 TABLET ORAL EVERY 6 HOURS PRN
Status: DISCONTINUED | OUTPATIENT
Start: 2021-07-07 | End: 2021-07-08 | Stop reason: HOSPADM

## 2021-07-07 RX ORDER — DIAPER,BRIEF,INFANT-TODD,DISP
1 EACH MISCELLANEOUS 4 TIMES DAILY PRN
Status: DISCONTINUED | OUTPATIENT
Start: 2021-07-07 | End: 2021-07-08 | Stop reason: HOSPADM

## 2021-07-07 RX ADMIN — BENZOCAINE AND LEVOMENTHOL: 200; 5 SPRAY TOPICAL at 11:43

## 2021-07-07 RX ADMIN — ROPIVACAINE HYDROCHLORIDE: 2 INJECTION, SOLUTION EPIDURAL; INFILTRATION at 07:30

## 2021-07-07 RX ADMIN — SODIUM CHLORIDE, SODIUM LACTATE, POTASSIUM CHLORIDE, AND CALCIUM CHLORIDE 999 ML/HR: .6; .31; .03; .02 INJECTION, SOLUTION INTRAVENOUS at 06:39

## 2021-07-07 RX ADMIN — IBUPROFEN 600 MG: 600 TABLET, FILM COATED ORAL at 23:43

## 2021-07-07 RX ADMIN — ROPIVACAINE HYDROCHLORIDE 10 ML: 2 INJECTION, SOLUTION EPIDURAL; INFILTRATION at 07:28

## 2021-07-07 RX ADMIN — SODIUM CHLORIDE, SODIUM LACTATE, POTASSIUM CHLORIDE, AND CALCIUM CHLORIDE 999 ML/HR: .6; .31; .03; .02 INJECTION, SOLUTION INTRAVENOUS at 00:38

## 2021-07-07 RX ADMIN — SODIUM CHLORIDE, SODIUM LACTATE, POTASSIUM CHLORIDE, AND CALCIUM CHLORIDE 125 ML/HR: .6; .31; .03; .02 INJECTION, SOLUTION INTRAVENOUS at 08:49

## 2021-07-07 RX ADMIN — LIDOCAINE HYDROCHLORIDE AND EPINEPHRINE 3 ML: 15; 5 INJECTION, SOLUTION EPIDURAL at 07:23

## 2021-07-07 NOTE — OB LABOR/OXYTOCIN SAFETY PROGRESS
Oxytocin Safety Progress Check Note - Lawence Presume 45 y o  female MRN: [id number]    Unit/Bed#: -01 Encounter: [id number]    Dose (jacob-units/min) Oxytocin: 8 jacob-units/min  Contraction Frequency (minutes): 2-4  Contraction Quality: Mild  Tachysystole: No   Cervical Dilation: 3        Cervical Effacement: 70  Fetal Station: -2  Baseline Rate: 120 bpm  Fetal Heart Rate: 158 BPM  FHR Category: Category I  Oxytocin Safety Progress Check: Safety check completed            Vital Signs:   Vitals:    07/07/21 0145   BP: 119/63   Pulse: (!) 48   Resp:    Temp:            Notes/comments:   Getting more uncomfortable  Continue pitocin titration     Judith Garcia MD 7/7/2021 2:19 AM

## 2021-07-07 NOTE — ANESTHESIA PROCEDURE NOTES
Epidural Block    Patient location during procedure: OB  Start time: 7/7/2021 7:15 AM  Reason for block: procedure for pain  Staffing  Performed: anesthesiologist and resident/CRNA   Anesthesiologist: Curry Werner MD  Resident/CRNA: Danelle Neal CRNA  Preanesthetic Checklist  Completed: patient identified, IV checked, risks and benefits discussed, surgical consent, monitors and equipment checked, pre-op evaluation and timeout performed  Epidural  Patient position: sitting  Prep: ChloraPrep and site prepped and draped  Patient monitoring: frequent blood pressure checks, continuous pulse ox and cardiac monitor  Approach: midline  Injection technique: POONAM saline  Needle  Needle type: Tuohy   Needle gauge: 17 G  Catheter size: 20 G  Catheter at skin depth: 11 cm  Catheter securement method: stabilization device (Tegaderm)  Test dose: negativelidocaine 1 5% with epinephrine 1:200,000 test dose, 3 mL  Assessment  Number of attempts: 1negative aspiration for CSF, negative aspiration for heme and no paresthesia on injection  patient tolerated the procedure well with no immediate complications

## 2021-07-07 NOTE — ANESTHESIA PREPROCEDURE EVALUATION
Procedure:  LABOR ANALGESIA    Relevant Problems   ANESTHESIA (within normal limits)      CARDIO (within normal limits)      GYN   (+) 39 weeks gestation of pregnancy   (+) Multigravida of advanced maternal age in third trimester      NEURO/PSYCH   (+) Anxiety state      PULMONARY (within normal limits)      Other   (+) Patient desires vaginal birth after  section ()   (+) Previous  delivery affecting pregnancy, antepartum        Physical Exam    Airway    Mallampati score: II  TM Distance: >3 FB  Neck ROM: full     Dental   No notable dental hx     Cardiovascular      Pulmonary      Other Findings        Anesthesia Plan  ASA Score- 2     Anesthesia Type- epidural with ASA Monitors  Additional Monitors:   Airway Plan:           Plan Factors-Exercise tolerance (METS): >4 METS  Chart reviewed  Existing labs reviewed  Patient summary reviewed  Patient is not a current smoker  Induction-     Postoperative Plan-     Informed Consent- Anesthetic plan and risks discussed with patient  I personally reviewed this patient with the CRNA  Discussed and agreed on the Anesthesia Plan with the CRNA  Milton Rodriguez

## 2021-07-07 NOTE — OB LABOR/OXYTOCIN SAFETY PROGRESS
Oxytocin Safety Progress Check Note - Marie Cho 45 y o  female MRN: [id number]    Unit/Bed#: -01 Encounter: [id number]    Dose (jacob-units/min) Oxytocin: 8 jacob-units/min  Contraction Frequency (minutes): 2-3  Contraction Quality: Strong  Tachysystole: No   Cervical Dilation: 5-6        Cervical Effacement: 90  Fetal Station: -1  Baseline Rate: 150 bpm  Fetal Heart Rate: 80 BPM  FHR Category: Category II  Oxytocin Safety Progress Check: Safety check completed            Vital Signs:   Vitals:    07/07/21 0814   BP: 133/72   Pulse: 82   Resp:    Temp:    SpO2:            Notes/comments:   Variables with contractions  Lucas placed to drain bladder  Will monitor closely     Patient comfortable s/p epidural     Darby De La Torre DO 1/0/4771 1:70 AM

## 2021-07-07 NOTE — OB LABOR/OXYTOCIN SAFETY PROGRESS
Oxytocin Safety Progress Check Note - Radha Gamez 45 y o  female MRN: [id number]    Unit/Bed#: -01 Encounter: [id number]    Dose (jacob-units/min) Oxytocin: 18 jacob-units/min  Contraction Frequency (minutes): 2-3  Contraction Quality: Moderate  Tachysystole: No   Cervical Dilation: 4        Cervical Effacement: 80  Fetal Station: -2  Baseline Rate: 125 bpm  Fetal Heart Rate: 154 BPM  FHR Category: Category I  Oxytocin Safety Progress Check: Safety check completed            Vital Signs:   Vitals:    07/07/21 0621   BP: 118/73   Pulse:    Resp:    Temp:            Notes/comments:     Worsening contractions  AROM - clear  Continue pitocin    Ita Swan MD 7/7/2021 6:36 AM

## 2021-07-07 NOTE — OB LABOR/OXYTOCIN SAFETY PROGRESS
Oxytocin Safety Progress Check Note - Jacquelyn Ahn 45 y o  female MRN: [id number]    Unit/Bed#: -01 Encounter: [id number]    Dose (jacob-units/min) Oxytocin: 18 jacob-units/min  Contraction Frequency (minutes): 2-3  Contraction Quality: Strong  Tachysystole: No   Cervical Dilation: 4        Cervical Effacement: 80  Fetal Station: -1  Baseline Rate: 125 bpm  Fetal Heart Rate: 154 BPM  FHR Category: Category II  Oxytocin Safety Progress Check: Safety check completed            Vital Signs:   Vitals:    07/07/21 0621   BP: 118/73   Pulse:    Resp:    Temp:            Notes/comments:    Pt rechecked bc 2 variable decelerations after arom  Worsening pain  Pitocin decreased to 8  Pt repositioned,  Will call for epidural    Mando Mcgovern MD 7/7/2021 6:37 AM

## 2021-07-07 NOTE — PLAN OF CARE
Problem: Knowledge Deficit  Goal: Verbalizes understanding of labor plan  Description: Assess patient/family/caregiver's baseline knowledge level and ability to understand information  Provide education via patient/family/caregiver's preferred learning method at appropriate level of understanding  1  Provide teaching at level of understanding  2  Provide teaching via preferred learning method(s)  Outcome: Progressing  Goal: Patient/family/caregiver demonstrates understanding of disease process, treatment plan, medications, and discharge instructions  Description: Complete learning assessment and assess knowledge base  Interventions:  - Provide teaching at level of understanding  - Provide teaching via preferred learning methods  Outcome: Progressing     Problem: Labor & Delivery  Goal: Manages discomfort  Description: Assess and monitor for signs and symptoms of discomfort  Assess patient's pain level regularly and per hospital policy  Administer medications as ordered  Support use of nonpharmacological methods to help control pain such as distraction, imagery, relaxation, and application of heat and cold  Collaborate with interdisciplinary team and patient to determine appropriate pain management plan  1  Include patient in decisions related to comfort  2  Offer non-pharmacological pain management interventions  3  Report ineffective pain management to physician  Outcome: Progressing  Goal: Patient vital signs are stable  Description: 1  Assess vital signs - vaginal delivery    Outcome: Progressing     Problem: BIRTH - VAGINAL/ SECTION  Goal: Fetal and maternal status remain reassuring during the birth process  Description: INTERVENTIONS:  - Monitor vital signs  - Monitor fetal heart rate  - Monitor uterine activity  - Monitor labor progression (vaginal delivery)  - Antibiotic prophylaxis  Outcome: Progressing  Goal: Emotionally satisfying birthing experience for mother/fetus  Description: Interventions:  - Assess, plan, implement and evaluate the nursing care given to the patient in labor  - Advocate the philosophy that each childbirth experience is a unique experience and support the family's chosen level of involvement and control during the labor process   - Actively participate in both the patient's and family's teaching of the birth process  - Consider cultural, Zoroastrian and age-specific factors and plan care for the patient in labor  Outcome: Progressing     Problem: PAIN - ADULT  Goal: Verbalizes/displays adequate comfort level or baseline comfort level  Description: Interventions:  - Encourage patient to monitor pain and request assistance  - Assess pain using appropriate pain scale  - Administer analgesics based on type and severity of pain and evaluate response  - Implement non-pharmacological measures as appropriate and evaluate response  - Consider cultural and social influences on pain and pain management  - Notify physician/advanced practitioner if interventions unsuccessful or patient reports new pain  Outcome: Progressing     Problem: INFECTION - ADULT  Goal: Absence or prevention of progression during hospitalization  Description: INTERVENTIONS:  - Assess and monitor for signs and symptoms of infection  - Monitor lab/diagnostic results  - Monitor all insertion sites, i e  indwelling lines, tubes, and drains  - Monitor endotracheal if appropriate and nasal secretions for changes in amount and color  - [place] appropriate cooling/warming therapies per order  - Administer medications as ordered  - Instruct and encourage patient and family to use good hand hygiene technique  - Identify and instruct in appropriate isolation precautions for identified infection/condition  Outcome: Progressing  Goal: Absence of fever/infection during neutropenic period  Description: INTERVENTIONS:  - Monitor WBC    Outcome: Progressing     Problem: SAFETY ADULT  Goal: Patient will remain free of falls  Description: INTERVENTIONS:  - Educate patient/family on patient safety including physical limitations  - Instruct patient to call for assistance with activity   - Keep Call bell within reach  - Keep bed low and locked with side rails adjusted as appropriate  - Keep care items and personal belongings within reach  - Initiate and maintain comfort rounds  - Make Fall Risk Sign visible to staff  - Offer Toileting every 2Hours, in advance of need  Outcome: Progressing  Goal: Maintain or return to baseline ADL function  Description: INTERVENTIONS:  -  Assess patient's ability to carry out ADLs; assess patient's baseline for ADL function and identify physical deficits which impact ability to perform ADLs (bathing, care of mouth/teeth, toileting, grooming, dressing, etc )  - Assess/evaluate cause of self-care deficits   - Assess range of motion  - Assess patient's mobility; develop plan if impaired  - Assess patient's need for assistive devices and provide as appropriate  - Encourage maximum independence but intervene and supervise when necessary  - Involve family in performance of ADLs  - Assess for home care needs following discharge   - Consider OT consult to assist with ADL evaluation and planning for discharge  - Provide patient education as appropriate  Outcome: Progressing  Goal: Maintains/Returns to pre admission functional level  Description: INTERVENTIONS:  - Perform BMAT or MOVE assessment daily    - Set and communicate daily mobility goal to care team and patient/family/caregiver     - Out of bed for toileting  - Record patient progress and toleration of activity level   Outcome: Progressing     Problem: DISCHARGE PLANNING  Goal: Discharge to home or other facility with appropriate resources  Description: INTERVENTIONS:  - Identify barriers to discharge w/patient and caregiver  - Arrange for needed discharge resources and transportation as appropriate  - Identify discharge learning needs (meds, wound care, etc )  - Arrange for interpretive services to assist at discharge as needed  - Refer to Case Management Department for coordinating discharge planning if the patient needs post-hospital services based on physician/advanced practitioner order or complex needs related to functional status, cognitive ability, or social support system  Outcome: Progressing

## 2021-07-07 NOTE — OB LABOR/OXYTOCIN SAFETY PROGRESS
Oxytocin Safety Progress Check Note - Frederick Valdez 45 y o  female MRN: [id number]    Unit/Bed#: -01 Encounter: [id number]    Dose (jacob-units/min) Oxytocin: 8 jacob-units/min  Contraction Frequency (minutes): (P) 2-5  Contraction Quality: (P) Strong  Tachysystole: (P) No   Cervical Dilation: 9        Cervical Effacement: 100  Fetal Station: 0  Baseline Rate: (P) 150 bpm  Fetal Heart Rate: (P) 134 BPM  FHR Category: (P) Category II  Oxytocin Safety Progress Check: Safety check completed            Vital Signs:   Vitals:    07/07/21 0902   BP: 121/73   Pulse: 80   Resp: 16   Temp: 97 6 °F (36 4 °C)   SpO2:            Notes/comments:        Cat II tracing, patient making good progress  Intermittent variables, some early decelerations  Monitor closely  Expect delivery soon         [facility]  9/6/7536 7:27 AM

## 2021-07-07 NOTE — PLAN OF CARE
Problem: Knowledge Deficit  Goal: Verbalizes understanding of labor plan  Description: Assess patient/family/caregiver's baseline knowledge level and ability to understand information  Provide education via patient/family/caregiver's preferred learning method at appropriate level of understanding  1  Provide teaching at level of understanding  2  Provide teaching via preferred learning method(s)  Outcome: Progressing  Goal: Patient/family/caregiver demonstrates understanding of disease process, treatment plan, medications, and discharge instructions  Description: Complete learning assessment and assess knowledge base    Interventions:  - Provide teaching at level of understanding  - Provide teaching via preferred learning methods  Outcome: Progressing     Problem: PAIN - ADULT  Goal: Verbalizes/displays adequate comfort level or baseline comfort level  Description: Interventions:  - Encourage patient to monitor pain and request assistance  - Assess pain using appropriate pain scale  - Administer analgesics based on type and severity of pain and evaluate response  - Implement non-pharmacological measures as appropriate and evaluate response  - Consider cultural and social influences on pain and pain management  - Notify physician/advanced practitioner if interventions unsuccessful or patient reports new pain  Outcome: Progressing     Problem: INFECTION - ADULT  Goal: Absence or prevention of progression during hospitalization  Description: INTERVENTIONS:  - Assess and monitor for signs and symptoms of infection  - Monitor lab/diagnostic results  - Monitor all insertion sites, i e  indwelling lines, tubes, and drains  - Monitor endotracheal if appropriate and nasal secretions for changes in amount and color  - [place] appropriate cooling/warming therapies per order  - Administer medications as ordered  - Instruct and encourage patient and family to use good hand hygiene technique  - Identify and instruct in appropriate isolation precautions for identified infection/condition  Outcome: Progressing  Goal: Absence of fever/infection during neutropenic period  Description: INTERVENTIONS:  - Monitor WBC    Outcome: Progressing     Problem: SAFETY ADULT  Goal: Patient will remain free of falls  Description: INTERVENTIONS:  - Keep Call bell within reach  - Keep bed low and locked with side rails adjusted as appropriate  - Keep care items and personal belongings within reach  - Initiate and maintain comfort rounds  - Apply yellow socks and bracelet for high fall risk patients  - Consider moving patient to room near nurses station  Outcome: Progressing  Goal: Maintain or return to baseline ADL function  Description: INTERVENTIONS:  -  Assess patient's ability to carry out ADLs; assess patient's baseline for ADL function and identify physical deficits which impact ability to perform ADLs (bathing, care of mouth/teeth, toileting, grooming, dressing, etc )  - Assess/evaluate cause of self-care deficits   - Assess range of motion  - Assess patient's mobility; develop plan if impaired  - Assess patient's need for assistive devices and provide as appropriate  - Encourage maximum independence but intervene and supervise when necessary  - Involve family in performance of ADLs  - Assess for home care needs following discharge   - Consider OT consult to assist with ADL evaluation and planning for discharge  - Provide patient education as appropriate  Outcome: Progressing  Goal: Maintains/Returns to pre admission functional level  Description: INTERVENTIONS:   - Set and communicate daily mobility goal to care team and patient/family/caregiver     - Out of bed for toileting  - Record patient progress and toleration of activity level   Outcome: Progressing     Problem: DISCHARGE PLANNING  Goal: Discharge to home or other facility with appropriate resources  Description: INTERVENTIONS:  - Identify barriers to discharge w/patient and caregiver  - Arrange for needed discharge resources and transportation as appropriate  - Identify discharge learning needs (meds, wound care, etc )  - Arrange for interpretive services to assist at discharge as needed  - Refer to Case Management Department for coordinating discharge planning if the patient needs post-hospital services based on physician/advanced practitioner order or complex needs related to functional status, cognitive ability, or social support system  Outcome: Progressing     Problem: POSTPARTUM  Goal: Experiences normal postpartum course  Description: INTERVENTIONS:  - Monitor maternal vital signs  - Assess uterine involution and lochia  Outcome: Progressing  Goal: Appropriate maternal -  bonding  Description: INTERVENTIONS:  - Identify family support  - Assess for appropriate maternal/infant bonding   -Encourage maternal/infant bonding opportunities  - Referral to  or  as needed  Outcome: Progressing  Goal: Establishment of infant feeding pattern  Description: INTERVENTIONS:  - Assess breast/bottle feeding  - Refer to lactation as needed  Outcome: Progressing

## 2021-07-07 NOTE — H&P
[address] [facility] 45 y o  female MRN: [id number]  Unit/Bed#: -01 Encounter: [id number]      Assessment: 45 y o  [id number] at 39w1d admitted for elective induction of labor at term  SVE: 2-3//-2  FHT: cat 1  Clinical EFW: 7 lb; Vertex confirmed by SVE  GBS status: negative       Plan:   · Admit  · CBC, RPR, Type & Screen  · Analgesia at maternal request  · Induction with pitocin    Dr Nai Collins aware        SUBJECTIVE:    Chief Complaint: elective induction of labor at term    HPI: Liza Zepeda is a 45 y o  [id number] with an FRED of 2021, by Last Menstrual Period at 36w3d who is being admitted for elective induction of labor at term  She denies having uterine contractions, has no LOF, and reports no VB  She states she has felt good FM  She has a h/o of LTCS in  followed by a successful  in   Pregnancy complications: Rh negative status    Patient Active Problem List   Diagnosis    Rh negative state in antepartum period    Anxiety state    Previous  delivery affecting pregnancy, antepartum    Patient desires vaginal birth after  section ()    Hx successful  (vaginal birth after ), currently pregnant    Multigravida of advanced maternal age in third trimester    Uterine fibroids affecting pregnancy in third trimester    39 weeks gestation of pregnancy    Dyspareunia, female       Baby complications/comments: none    Review of Systems   Constitutional: Negative for chills and fever  Eyes: Negative for visual disturbance  Respiratory: Negative for cough and shortness of breath  Cardiovascular: Negative for chest pain and leg swelling  Gastrointestinal: Negative for abdominal pain, constipation, diarrhea, nausea and vomiting  Genitourinary: Negative for dysuria  Neurological: Negative for dizziness, light-headedness and headaches         OB History    Para Term  AB Living   4 2 2   1 2   SAB TAB Ectopic Multiple Live Births           2      # Outcome Date GA Lbr Nick/2nd Weight Sex Delivery Anes PTL Lv   4 Current            3 Term 11 40w0d  3742 g (8 lb 4 oz) F  EPI N JOY   2 AB 09 9w0d          1 Term 04 40w0d  3742 g (8 lb 4 oz) F CS-LTranv EPI N JOY      Birth Comments: NR FHR      Complications: Fetal Intolerance       Past Medical History:   Diagnosis Date    Abnormal Pap smear of cervix     ADHD     Anxiety     no meds    Herpes     denies    HPV (human papilloma virus) infection     Rh incompatibility     Urinary tract infection     Varicella        Past Surgical History:   Procedure Laterality Date     SECTION      COLPOSCOPY         Social History     Tobacco Use    Smoking status: Former Smoker     Packs/day: 0 25     Years: 2 00     Pack years: 0 50     Types: Cigarettes     Quit date:      Years since quittin 5    Smokeless tobacco: Never Used   Substance Use Topics    Alcohol use: Not Currently     Comment: socially prior to confirmed pregnancy       Allergies   Allergen Reactions    Other Allergic Rhinitis     SEASONAL ALLERGIES       Medications Prior to Admission   Medication    aspirin 81 mg chewable tablet    docusate sodium (COLACE) 250 MG capsule    Erythromycin 2 % PADS    levocetirizine (XYZAL) 5 MG tablet    loratadine (CLARITIN) 10 mg tablet    Prenatal MV-Min-Fe Fum-FA-DHA (Prenatal Multi +DHA) 27-0 8-200 MG CAPS           OBJECTIVE:  Vitals:  Temp:  [97 9 °F (36 6 °C)] 97 9 °F (36 6 °C)  HR:  [93] 93  Resp:  [18] 18  BP: (139)/(80) 139/80  Body mass index is 31 12 kg/m²       Physical Exam:  OBGyn Exam     SVE:   2-3/70/-2    FHT:  Baseline Rate: 150 bpm  FHR Category: Category I    TOCO:   Contraction Frequency (minutes): 2-4  Contraction Duration (seconds): 70 seconds  Contraction Quality: Mild    Lab Results   Component Value Date    WBC 9 80 2021    HGB 10 0 (L) 2021    HCT 31 4 (L) 2021     (H) 2021 Lab Results   Component Value Date     06/26/2015    K 4 0 06/26/2015     06/26/2015    CO2 27 06/26/2015    BUN 15 06/26/2015    CREATININE 0 72 06/26/2015    GLUCOSE 79 06/26/2015       Prenatal Labs: Reviewed      Blood type: O-  Antibody: negative  Group B strep: negative  HIV: negative  Hepatitis B: negative  RPR: non-reactive  Rubella: Immune  Varicella: Immune  1 hour Glucose: 36  Platelets: 373  Hgb: 9 5    >2 Midnights  INPATIENT       Janett Cash MD  OB/GYN PGY-1  7/6/2021  9:38 PM

## 2021-07-07 NOTE — L&D DELIVERY NOTE
Vaginal Delivery Summary - OB/GYN   Seth Chowdhury 45 y o  female MRN: [id number]  Unit/Bed#: -01 Encounter: [id number]          Predelivery Diagnosis:  1  Pregnancy at 39 wks 2days  for e IOL  2  Prior CS, prior , desires TOLAC  3  Long pregnancy interval (10 years)    Postdelivery Diagnosis:  1  Same as above  2  Delivery of term     Procedure: normal spontaneous vaginal delivery/    Attending: Nelson Stovall    Resident: none    Anesthesia: epidural    QBL: [id number]    Complications: None    Specimens: cord blood, arterial and venous cord blood gasses, placenta (storage)    Cord Gas:   Recent Results (from the past 3 hour(s))   Blood gas, venous, cord    Collection Time: 21 10:55 AM   Result Value Ref Range    pH, Cord Michael 7 312 7 190 - 7 490    pCO2, Cord Michael 41 7 27 0 - 43 0 mm HG    pO2, Cord Michael 30 6 15 0 - 45 0 mm HG    HCO3, Cord Michael 20 6 12 2 - 28 6 mmol/L    Base Exc, Cord Michael -5 3 (L) 1 0 - 9 0 mmol/L    O2 Cont, Cord Michael 12 0 mL/dL    O2 HGB,VENOUS CORD 64 3 %   Blood gas, arterial, cord    Collection Time: 21 10:55 AM   Result Value Ref Range    pH, Cord Art 7 334 7 230 - 7 430    pCO2, Cord Art 38 4 30 0 - 60 0    pO2, Cord Art 28 7 (H) 5 0 - 25 0 mm HG    HCO3, Cord Art 20 0 17 3 - 27 3 mmol/L    Base Exc, Cord Art -5 4 (L) 3 0 - 11 0 mmol/L    O2 Content, Cord Art 11 9 ml/dl    O2 Hgb, Arterial Cord 65 1 %         Findings:   1  Viable female at 1050, with APGARS of 8 and 9 at 1 and 5 minutes respectively, Weight not available at time of dictation  2  Intact 3vc placenta delivered spontaneously at 1053  3  In tact perineum    Disposition:  Patient tolerated the procedure well and was recovering in labor and delivery room     Brief history and labor course:  Ms Seth Chowdhury is a 45 y o  [id number] at [id number] who presented for eIOL, desiring TOLAC  She was 3cm on arrival and received pitocin for induction  Amniotomy was performed at 0621   She received an epidural for pain management  She progressed to complete at 1050  FHT was overall reassuring with intermittent periods of category II  Warm compress applied and perineal massage performed during pushing  Description of procedure    After pushing for 20 minutes, the patient delivered a viable female  at 0  The fetal vertex delivered spontaneously, JUAN RAMON  There was no evidence for nuchal cord  The right anterior shoulder delivered atraumatically with maternal expulsive forces and the assistance of gentle downward traction  The posterior shoulder delivered with maternal expulsive forces and the assistance of gentle upward traction  The remainder of the fetus delivered spontaneously  Upon delivery, the infant was placed on the mothers abdomen and the cord was clamped and cut after a 30-60 second delay  Awaiting nurse resuscitators evaluated the   Arterial and venous cord blood gases and cord blood was collected for analysis  These were promptly sent to the lab  In the immediate post-partum, 30 units of IV pitocin was administered, and the uterus was noted to contract down well with massage and pitocin  The placenta delivered spontaneously at 1053 and was noted to have a centrally inserted 3 vessel cord  The uterus was massaged until firm and the lower uterine segment was cleared of all clot and debris  The vagina, cervix, perineum, and rectum were inspected and there was noted to be intact  At the conclusion of the procedure, all needle, sponge, and instrument counts were noted to be correct  Patient tolerated the procedure well  I was present and participated in all key portions of the case  Darby Kay DO, [facility]  [phone]

## 2021-07-08 VITALS
OXYGEN SATURATION: 97 % | HEIGHT: 65 IN | SYSTOLIC BLOOD PRESSURE: 132 MMHG | HEART RATE: 78 BPM | TEMPERATURE: 98.1 F | DIASTOLIC BLOOD PRESSURE: 77 MMHG | WEIGHT: 187 LBS | RESPIRATION RATE: 18 BRPM | BODY MASS INDEX: 31.16 KG/M2

## 2021-07-08 PROBLEM — Z3A.39 39 WEEKS GESTATION OF PREGNANCY: Status: RESOLVED | Noted: 2021-01-20 | Resolved: 2021-07-08

## 2021-07-08 LAB
ABO GROUP BLD: NORMAL
BLD GP AB SCN SERPL QL: NEGATIVE
FETAL CELL SCN BLD QL ROSETTE: NEGATIVE
RH BLD: NEGATIVE

## 2021-07-08 PROCEDURE — 86850 RBC ANTIBODY SCREEN: CPT | Performed by: OBSTETRICS & GYNECOLOGY

## 2021-07-08 PROCEDURE — 86901 BLOOD TYPING SEROLOGIC RH(D): CPT | Performed by: OBSTETRICS & GYNECOLOGY

## 2021-07-08 PROCEDURE — 86900 BLOOD TYPING SEROLOGIC ABO: CPT | Performed by: OBSTETRICS & GYNECOLOGY

## 2021-07-08 PROCEDURE — 99024 POSTOP FOLLOW-UP VISIT: CPT | Performed by: OBSTETRICS & GYNECOLOGY

## 2021-07-08 PROCEDURE — 85461 HEMOGLOBIN FETAL: CPT | Performed by: OBSTETRICS & GYNECOLOGY

## 2021-07-08 RX ORDER — IBUPROFEN 600 MG/1
600 TABLET ORAL EVERY 6 HOURS PRN
Qty: 30 TABLET | Refills: 0
Start: 2021-07-08 | End: 2021-08-06

## 2021-07-08 RX ORDER — ACETAMINOPHEN 325 MG/1
650 TABLET ORAL EVERY 6 HOURS PRN
Refills: 0
Start: 2021-07-08 | End: 2021-08-06

## 2021-07-08 RX ADMIN — DOCUSATE SODIUM 100 MG: 100 CAPSULE, LIQUID FILLED ORAL at 08:49

## 2021-07-08 RX ADMIN — HUMAN RHO(D) IMMUNE GLOBULIN 300 MCG: 300 INJECTION, SOLUTION INTRAMUSCULAR at 12:57

## 2021-07-08 NOTE — PLAN OF CARE
Problem: PAIN - ADULT  Goal: Verbalizes/displays adequate comfort level or baseline comfort level  Description: Interventions:  - Encourage patient to monitor pain and request assistance  - Assess pain using appropriate pain scale  - Administer analgesics based on type and severity of pain and evaluate response  - Implement non-pharmacological measures as appropriate and evaluate response  - Consider cultural and social influences on pain and pain management  - Notify physician/advanced practitioner if interventions unsuccessful or patient reports new pain  7/8/2021 0001 by Stephanie Cabrales RN  Outcome: Progressing  7/8/2021 0000 by Stephanie Cabrales RN  Outcome: Progressing     Problem: INFECTION - ADULT  Goal: Absence or prevention of progression during hospitalization  Description: INTERVENTIONS:  - Assess and monitor for signs and symptoms of infection  - Monitor lab/diagnostic results  - Monitor all insertion sites, i e  indwelling lines, tubes, and drains  - Monitor endotracheal if appropriate and nasal secretions for changes in amount and color  - [place] appropriate cooling/warming therapies per order  - Administer medications as ordered  - Instruct and encourage patient and family to use good hand hygiene technique  - Identify and instruct in appropriate isolation precautions for identified infection/condition  7/8/2021 0001 by Stephanie Cabrales RN  Outcome: Progressing  7/8/2021 0000 by Stephanie Cabrales RN  Outcome: Progressing  Goal: Absence of fever/infection during neutropenic period  Description: INTERVENTIONS:  - Monitor WBC    7/8/2021 0001 by Stephanie Cabrales RN  Outcome: Progressing  7/8/2021 0000 by Stephanie Cabrales RN  Outcome: Progressing     Problem: SAFETY ADULT  Goal: Patient will remain free of falls  Description: INTERVENTIONS:  - Educate patient/family on patient safety including physical limitations  - Instruct patient to call for assistance with activity   - Consult OT/PT to assist with strengthening/mobility   - Keep Call bell within reach  - Keep bed low and locked with side rails adjusted as appropriate  - Keep care items and personal belongings within reach  - Initiate and maintain comfort rounds  - Make Fall Risk Sign visible to staff  - Offer [address] necessary fall risk management equipment:   - Apply yellow socks and bracelet for high fall risk patients  - Consider moving patient to room near nurses station  7/8/2021 0001 by Sean Escobedo RN  Outcome: Progressing  7/8/2021 0000 by Sean Escobedo RN  Outcome: Progressing  Goal: Maintain or return to baseline ADL function  Description: INTERVENTIONS:  -  Assess patient's ability to carry out ADLs; assess patient's baseline for ADL function and identify physical deficits which impact ability to perform ADLs (bathing, care of mouth/teeth, toileting, grooming, dressing, etc )  - Assess/evaluate cause of self-care deficits   - Assess range of motion  - Assess patient's mobility; develop plan if impaired  - Assess patient's need for assistive devices and provide as appropriate  - Encourage maximum independence but intervene and supervise when necessary  - Involve family in performance of ADLs  - Assess for home care needs following discharge   - Consider OT consult to assist with ADL evaluation and planning for discharge  - Provide patient education as appropriate  7/8/2021 0001 by Sean Escobedo RN  Outcome: Progressing  7/8/2021 0000 by Sean Escobedo RN  Outcome: Progressing  Goal: Maintains/Returns to pre admission functional level  Description: INTERVENTIONS:  - Perform BMAT or MOVE assessment daily    - Set and communicate daily mobility goal to care team and patient/family/caregiver     - Collaborate with rehabilitation services on mobility goals if consulted  - Perform Range of Motion   - Reposition patient   - Dangle patient   - Stand patient   - Ambulate patient   - Out of bed to chair   - Out of bed for meals  - Out of bed for toileting  - Record patient progress and toleration of activity level   2021 0001 by Paras Cabrales RN  Outcome: Progressing  2021 0000 by Paras Cabrales RN  Outcome: Progressing     Problem: DISCHARGE PLANNING  Goal: Discharge to home or other facility with appropriate resources  Description: INTERVENTIONS:  - Identify barriers to discharge w/patient and caregiver  - Arrange for needed discharge resources and transportation as appropriate  - Identify discharge learning needs (meds, wound care, etc )  - Arrange for interpretive services to assist at discharge as needed  - Refer to Case Management Department for coordinating discharge planning if the patient needs post-hospital services based on physician/advanced practitioner order or complex needs related to functional status, cognitive ability, or social support system  2021 0001 by Paras Cabrales RN  Outcome: Progressing  2021 0000 by Paras Cabrales RN  Outcome: Progressing     Problem: POSTPARTUM  Goal: Experiences normal postpartum course  Description: INTERVENTIONS:  - Monitor maternal vital signs  - Assess uterine involution and lochia  Outcome: Progressing  Goal: Appropriate maternal -  bonding  Description: INTERVENTIONS:  - Identify family support  - Assess for appropriate maternal/infant bonding   -Encourage maternal/infant bonding opportunities  - Referral to  or  as needed  Outcome: Progressing  Goal: Establishment of infant feeding pattern  Description: INTERVENTIONS:  - Assess breast/bottle feeding  - Refer to lactation as needed  Outcome: Progressing

## 2021-07-08 NOTE — PLAN OF CARE
Problem: PAIN - ADULT  Goal: Verbalizes/displays adequate comfort level or baseline comfort level  Description: Interventions:  - Encourage patient to monitor pain and request assistance  - Assess pain using appropriate pain scale  - Administer analgesics based on type and severity of pain and evaluate response  - Implement non-pharmacological measures as appropriate and evaluate response  - Consider cultural and social influences on pain and pain management  - Notify physician/advanced practitioner if interventions unsuccessful or patient reports new pain  Outcome: Completed     Problem: INFECTION - ADULT  Goal: Absence or prevention of progression during hospitalization  Description: INTERVENTIONS:  - Assess and monitor for signs and symptoms of infection  - Monitor lab/diagnostic results  - Monitor all insertion sites, i e  indwelling lines, tubes, and drains  - Monitor endotracheal if appropriate and nasal secretions for changes in amount and color  - [place] appropriate cooling/warming therapies per order  - Administer medications as ordered  - Instruct and encourage patient and family to use good hand hygiene technique  - Identify and instruct in appropriate isolation precautions for identified infection/condition  Outcome: Completed  Goal: Absence of fever/infection during neutropenic period  Description: INTERVENTIONS:  - Monitor WBC    Outcome: Completed     Problem: SAFETY ADULT  Goal: Patient will remain free of falls  Description: INTERVENTIONS:  - Educate patient/family on patient safety including physical limitations  - Instruct patient to call for assistance with activity   - Consult OT/PT to assist with strengthening/mobility   - Keep Call bell within reach  - Keep bed low and locked with side rails adjusted as appropriate  - Keep care items and personal belongings within reach  - Initiate and maintain comfort rounds  - Make Fall Risk Sign visible to staff  - Apply yellow socks and bracelet for high fall risk patients  - Consider moving patient to room near nurses station  Outcome: Completed  Goal: Maintain or return to baseline ADL function  Description: INTERVENTIONS:  -  Assess patient's ability to carry out ADLs; assess patient's baseline for ADL function and identify physical deficits which impact ability to perform ADLs (bathing, care of mouth/teeth, toileting, grooming, dressing, etc )  - Assess/evaluate cause of self-care deficits   - Assess range of motion  - Assess patient's mobility; develop plan if impaired  - Assess patient's need for assistive devices and provide as appropriate  - Encourage maximum independence but intervene and supervise when necessary  - Involve family in performance of ADLs  - Assess for home care needs following discharge   - Consider OT consult to assist with ADL evaluation and planning for discharge  - Provide patient education as appropriate  Outcome: Completed  Goal: Maintains/Returns to pre admission functional level  Description: INTERVENTIONS:  - Perform BMAT or MOVE assessment daily    - Set and communicate daily mobility goal to care team and patient/family/caregiver     - Collaborate with rehabilitation services on mobility goals if consulted  - Out of bed for toileting  - Record patient progress and toleration of activity level   Outcome: Completed     Problem: DISCHARGE PLANNING  Goal: Discharge to home or other facility with appropriate resources  Description: INTERVENTIONS:  - Identify barriers to discharge w/patient and caregiver  - Arrange for needed discharge resources and transportation as appropriate  - Identify discharge learning needs (meds, wound care, etc )  - Arrange for interpretive services to assist at discharge as needed  - Refer to Case Management Department for coordinating discharge planning if the patient needs post-hospital services based on physician/advanced practitioner order or complex needs related to functional status, cognitive ability, or social support system  Outcome: Completed     Problem: POSTPARTUM  Goal: Experiences normal postpartum course  Description: INTERVENTIONS:  - Monitor maternal vital signs  - Assess uterine involution and lochia  Outcome: Completed  Goal: Appropriate maternal -  bonding  Description: INTERVENTIONS:  - Identify family support  - Assess for appropriate maternal/infant bonding   -Encourage maternal/infant bonding opportunities  - Referral to  or  as needed  Outcome: Completed  Goal: Establishment of infant feeding pattern  Description: INTERVENTIONS:  - Assess breast/bottle feeding  - Refer to lactation as needed  Outcome: Completed

## 2021-07-08 NOTE — PROGRESS NOTES
Progress Note - OB/GYN   Luc Faust 45 y o  female MRN: [id number]  Unit/Bed#: -01 Encounter: [id number]    Assessment:  PPD#1 s/p Spontaneous Vaginal Delivery (successful ), stable    Plan:    1) Postpartum care  Encourage ambulation   Encourage breastfeeding  Continue current meds   2) Rh negative   Rhogam/RhIg ordered  3) Disposition   Anticipate discharge home today if baby clear to go    Subjective/Objective     Subjective:     Pain: no  Tolerating PO: yes  Voiding: yes  Flatus: yes  BM: yes  Ambulating: yes  Breastfeeding: Breastfeeding  Chest pain: no  Shortness of breath: no  Leg pain: no  Lochia: minimal    Objective:     Vitals:  Vitals:    21 1620 21 2001 21 0000 21 0400   BP: 120/68 125/72 123/67 113/68   BP Location: Right arm Right arm Left arm Right arm   Pulse: 95 85 80 80   Resp: 18 18 18 18   Temp: 98 6 °F (37 °C) 98 5 °F (36 9 °C) 99 1 °F (37 3 °C) 98 °F (36 7 °C)   TempSrc: Oral Oral Oral Oral   SpO2: 97% 97%     Weight:       Height:           Physical Exam:   GEN: appears well, alert and oriented x 3, pleasant and cooperative   CV: +S1, +S2, no murmurs/rubs/gallops appreciated  RESP: no labored breathing  ABDOMEN: soft, no tenderness, no distention, Uterine fundus firm and non-tender, -1 cm below the umbilicus   EXTREMITIES: non-tender  NEURO Alert and oriented to person, place, and time         Lab Results   Component Value Date    WBC 16 92 (H) 2021    HGB 9 5 (L) 2021    HCT 30 3 (L) 2021    MCV 85 2021     (H) 2021         Mel Mcclelland MD, PGY-1  Obstetrics & Gynecology  21

## 2021-07-08 NOTE — PLAN OF CARE
Problem: PAIN - ADULT  Goal: Verbalizes/displays adequate comfort level or baseline comfort level  Description: Interventions:  - Encourage patient to monitor pain and request assistance  - Assess pain using appropriate pain scale  - Administer analgesics based on type and severity of pain and evaluate response  - Implement non-pharmacological measures as appropriate and evaluate response  - Consider cultural and social influences on pain and pain management  - Notify physician/advanced practitioner if interventions unsuccessful or patient reports new pain  Outcome: Progressing     Problem: INFECTION - ADULT  Goal: Absence or prevention of progression during hospitalization  Description: INTERVENTIONS:  - Assess and monitor for signs and symptoms of infection  - Monitor lab/diagnostic results  - Monitor all insertion sites, i e  indwelling lines, tubes, and drains  - Monitor endotracheal if appropriate and nasal secretions for changes in amount and color  - [place] appropriate cooling/warming therapies per order  - Administer medications as ordered  - Instruct and encourage patient and family to use good hand hygiene technique  - Identify and instruct in appropriate isolation precautions for identified infection/condition  Outcome: Progressing  Goal: Absence of fever/infection during neutropenic period  Description: INTERVENTIONS:  - Monitor WBC    Outcome: Progressing     Problem: SAFETY ADULT  Goal: Patient will remain free of falls  Description: INTERVENTIONS:  - Educate patient/family on patient safety including physical limitations  - Instruct patient to call for assistance with activity   - Consult OT/PT to assist with strengthening/mobility   - Keep Call bell within reach  - Keep bed low and locked with side rails adjusted as appropriate  - Keep care items and personal belongings within reach  - Initiate and maintain comfort rounds  - Make Fall Risk Sign visible to staff  - Offer Toileting every  Hours, in advance of need  - Initiate/Maintain alarm  - Obtain necessary fall risk management equipment:   - Apply yellow socks and bracelet for high fall risk patients  - Consider moving patient to room near nurses station  Outcome: Progressing  Goal: Maintain or return to baseline ADL function  Description: INTERVENTIONS:  -  Assess patient's ability to carry out ADLs; assess patient's baseline for ADL function and identify physical deficits which impact ability to perform ADLs (bathing, care of mouth/teeth, toileting, grooming, dressing, etc )  - Assess/evaluate cause of self-care deficits   - Assess range of motion  - Assess patient's mobility; develop plan if impaired  - Assess patient's need for assistive devices and provide as appropriate  - Encourage maximum independence but intervene and supervise when necessary  - Involve family in performance of ADLs  - Assess for home care needs following discharge   - Consider OT consult to assist with ADL evaluation and planning for discharge  - Provide patient education as appropriate  Outcome: Progressing  Goal: Maintains/Returns to pre admission functional level  Description: INTERVENTIONS:  - Perform BMAT or MOVE assessment daily    - Set and communicate daily mobility goal to care team and patient/family/caregiver  - Collaborate with rehabilitation services on mobility goals if consulted  - Perform Range of Motion  times a day  - Reposition patient every  hours    - Dangle patient  times a day  - Stand patient  times a day  - Ambulate patient  times a day  - Out of bed to chair  times a day   - Out of bed for meals times a day  - Out of bed for toileting  - Record patient progress and toleration of activity level   Outcome: Progressing     Problem: DISCHARGE PLANNING  Goal: Discharge to home or other facility with appropriate resources  Description: INTERVENTIONS:  - Identify barriers to discharge w/patient and caregiver  - Arrange for needed discharge resources and transportation as appropriate  - Identify discharge learning needs (meds, wound care, etc )  - Arrange for interpretive services to assist at discharge as needed  - Refer to Case Management Department for coordinating discharge planning if the patient needs post-hospital services based on physician/advanced practitioner order or complex needs related to functional status, cognitive ability, or social support system  Outcome: Progressing     Problem: POSTPARTUM  Goal: Experiences normal postpartum course  Description: INTERVENTIONS:  - Monitor maternal vital signs  - Assess uterine involution and lochia  Outcome: Progressing  Goal: Appropriate maternal -  bonding  Description: INTERVENTIONS:  - Identify family support  - Assess for appropriate maternal/infant bonding   -Encourage maternal/infant bonding opportunities  - Referral to  or  as needed  Outcome: Progressing  Goal: Establishment of infant feeding pattern  Description: INTERVENTIONS:  - Assess breast/bottle feeding  - Refer to lactation as needed  Outcome: Progressing

## 2021-07-08 NOTE — LACTATION NOTE
This note was copied from a baby's chart  CONSULT - LACTATION  Baby Girl [place] 1 days female MRN: [id number]    [facility] AN CAR NON INV Room / Bed: (N)/(N) Encounter: [id number]    Maternal Information     MOTHER:  Lisa Lima  Maternal Age: 45 y o    OB History: # 1 - Date: 04, Sex: Female, Weight: 3742 g (8 lb 4 oz), GA: 40w0d, Delivery: , Low Transverse, Apgar1: None, Apgar5: None, Living: Living, Birth Comments: NR FHR    # 2 - Date: 09, Sex: None, Weight: None, GA: 9w0d, Delivery: None, Apgar1: None, Apgar5: None, Living: None, Birth Comments: None    # 3 - Date: 11, Sex: Female, Weight: 3742 g (8 lb 4 oz), GA: 40w0d, Delivery: , Spontaneous, Apgar1: None, Apgar5: None, Living: Living, Birth Comments: None    # 4 - Date: None, Sex: None, Weight: None, GA: None, Delivery: None, Apgar1: None, Apgar5: None, Living: None, Birth Comments: None   Previouse breast reduction surgery? No    Lactation history:   Has patient previously breast fed: Yes   How long had patient previously breast fed: 1 year each   Previous breast feeding complications: None     Past Surgical History:   Procedure Laterality Date     SECTION      COLPOSCOPY          Birth information:  YOB: 2020   Time of birth: 10:50 AM   Sex: female   Delivery type: Vaginal, Spontaneous   Birth Weight: 3650 g (8 lb 0 8 oz)   Percent of Weight Change: -2%     Gestational Age: 44w2d   [unfilled]    Assessment       Feeding recommendations:  breast feed on demand     Met with mother  Provided mother with Ready, Set, Baby booklet  Discussed Skin to Skin contact an benefits to mom and baby  Talked about the delay of the first bath until baby has adjusted  Spoke about the benefits of rooming in  Feeding on cue and what that means for recognizing infant's hunger  Avoidance of pacifiers for the first month discussed   Talked about exclusive breastfeeding for the first 6 months  Positioning and latch reviewed as well as showing images of other feeding positions  Discussed the properties of a good latch in any position  Reviewed hand/manual expression  Discussed s/s that baby is getting enough milk and some s/s that breastfeeding dyad may need further help  Gave information on common concerns, what to expect the first few weeks after delivery, preparing for other caregivers, and how partners can help  Resources for support also provided  Information on hand expression given  Discussed benefits of knowing how to manually express breast including stimulating milk supply, softening nipple for latch and evacuating breast in the event of engorgement  Discussed 2nd night syndrome and ways to calm infant  Hand out given  Met with mother to go over discharge breastfeeding booklet including the feeding log  Emphasized 8 or more (12) feedings in a 24 hour period, what to expect for the number of diapers per day of life and the progression of properties of the  stooling pattern  Reviewed breastfeeding and your lifestyle, storage and preparation of breast milk, how to keep you breast pump clean, the employed breastfeeding mother and paced bottle feeding handouts  Booklet included Breastfeeding Resources for after discharge including access to the number for the [address]  Discussed s/s engorgement and how to manage with medications, additional feedings at the breast or pumping sessions as needed, and cool compresses as well as s/s and management of mastitis and when to contact physician  Feeding log reviewed  Enc Mom to call for lactation support as needed after going home       Oscar Tomlin RN 2021 3:51 PM

## 2021-07-08 NOTE — DISCHARGE SUMMARY
Discharge Summary - Seth Chowdhury 45 y o  female MRN: [id number]    Unit/Bed#: -01 Encounter: [id number]    Admission Date: 2021     Discharge Date: 2021    Admitting Diagnosis:   Patient Active Problem List   Diagnosis    Rh negative state in antepartum period    Anxiety state    Previous  delivery affecting pregnancy, antepartum    Patient desires vaginal birth after  section ()    Hx successful  (vaginal birth after ), currently pregnant    Multigravida of advanced maternal age in third trimester    Uterine fibroids affecting pregnancy in third trimester    39 weeks gestation of pregnancy    Dyspareunia, female       Discharge Diagnosis:   Same, delivered    Procedures:   vaginal birth after  ()    Admitting Attending: Dr Nelson Stovall  Delivery Attending: Dr Nirmala Garcia,   Discharge Attending: Dr Ann Peña:     Seth Chowdhury is a 45 y o  P2P0120 who [facility]  She delivered a viable female  on 2021 at 1050  Weight 8lbs 0 8oz via vaginal birth after  ()  Apgars were 8 (1 min) and 9 (5 min)   was transferred to  nursery  Patient tolerated the procedure well and was transferred to recovery in stable condition  Her post-partum course was uncomplicated  Her post-partum pain was well controlled with oral analgesics  On day of discharge, she was ambulating and able to reasonably perform all ADLs  She was voiding and had appropriate bowel function  Pain was well controlled  She was discharged home on postpartum day #2 without complications  Patient was instructed to follow up with her OB as an outpatient and was given appropriate warnings to call doctor or provider if she develops signs of infection or uncontrolled pain  On day of discharge she was ambulating, voiding spontaneously, tolerating oral intake and hemodynamically stable   Mom's blood type is O negative and  Rhogam was given  Disposition: Home    Planned Readmission: No    Discharge Medications:   Prenatal vitamin daily for 6 months or the duration of nursing, whichever is longer  Motrin 600 mg orally every 6 hours as needed for pain  Tylenol (over the counter) per bottle directions as needed for pain  Hydrocortisone cream 1% (over the counter) applied 1-2x daily to perineum as needed  Witch hazel pads for perineal discomfort as needed      Discharge instructions :   -Do not place anything (no partner, tampons or douche) in your vagina for 6 weeks  -You may walk for exercise for the first 6 weeks then gradually return to your usual activities    -Please do not drive for 1 week if you have no stitches and for 2 weeks if you have stitches or underwent a  delivery     -You may take baths or shower per your preference    -Please look at your bust (breasts) in the mirror daily and call your doctor for redness or tenderness or increased warmth  - If you have had a  section please look at your incision daily as well and call provider for increasing redness or steady drainage from the incision    -Please call your doctor's office if temperature > 100 4*F or 38* C, worsening pain or a foul discharge      Follow Up:  - Follow up in 3-6 weeks for postpartum visit    Jadiel Moseley MD   OB/Gyn PGY-1   6:47 PM  21

## 2021-07-09 NOTE — UTILIZATION REVIEW
Inpatient Admission Authorization Request   Notification of Maternity/Delivery &  Birth Information for Admission   SERVICING FACILITY:   [address]  Tax ID: [id number]  NPI: [id number]  Place of Service: Inpatient [address] [facility]  [address]  Place of Service Code: 24     ATTENDING PROVIDER:  Attending Name and NPI#: Ismael Escobar [[id number]]  Address: Roya Miller  [address]  Phone: [phone]     UTILIZATION REVIEW CONTACT:  Fabricio Chauhan, Utilization   Network Utilization Review Department  Phone: [phone]  Fax [phone]  Email: [email or web address]     PHYSICIAN ADVISORY SERVICES:  FOR IINM-[facility] REVIEW - MEDICAL NECESSITY DENIAL  Phone: [phone]  Fax: [phone]  Email: [email or web address]     TYPE OF REQUEST:  Inpatient Status     ADMISSION INFORMATION:  ADMISSION DATE/TIME: 21  8:05 PM  PATIENT DIAGNOSIS CODE/DESCRIPTION:  39 weeks gestation of pregnancy [Z3A 39]  Encounter for full-term uncomplicated delivery [K78] The primary encounter diagnosis was 39 weeks gestation of pregnancy  Diagnoses of Hx successful  (vaginal birth after ), currently pregnant, Previous  delivery affecting pregnancy, antepartum, and , delivered were also pertinent to this visit  1  39 weeks gestation of pregnancy    2  Hx successful  (vaginal birth after ), currently pregnant    3  Previous  delivery affecting pregnancy, antepartum    4   , delivered      DISCHARGE DATE/TIME: 2021  3:50 PM  DISCHARGE DISPOSITION (IF DISCHARGED): Home/Self Care     MOTHER AND  INFORMATION:  Mother: Chrissy Harris 1981   Delivering clinician: Pablo Matias   OB History        4    Para   2    Term   2            AB   1    Living   2       SAB        TAB        Ectopic        Multiple        Live Births 2                Name & MRN:   Information for the patient's :  Dahlia Cole Girl Nyra Lunch) [[id number]]     [place] Delivery Information:  Sex: female  Delivered 2021 10:50 AM by Vaginal, Spontaneous; Gestational Age: 44w2d     Measurements:  Weight: 8 lb 0 8 oz (3650 g); Height: 19 5"    APGAR 1 minute 5 minutes 10 minutes   Totals: 8 9       Birth Information: 45 y o  female MRN: [id number] Unit/Bed#: -01 Estimated Date of Delivery: 21  Birthweight: No birth weight on file  Gestational Age: <None> Delivery Type: Vaginal, Spontaneous          APGARS  One minute Five minutes Ten minutes   Totals:               IMPORTANT INFORMATION:  Please contact the [facility] directly with any questions or concerns regarding this request  Department voicemails are confidential     Send requests for admission clinical reviews, concurrent reviews, approvals, and administrative denials due to lack of clinical to fax [phone]

## 2021-07-13 LAB — PLACENTA IN STORAGE: NORMAL

## 2021-08-06 ENCOUNTER — POSTPARTUM VISIT (OUTPATIENT)
Dept: OBGYN CLINIC | Facility: CLINIC | Age: 41
End: 2021-08-06

## 2021-08-06 VITALS
WEIGHT: 164 LBS | BODY MASS INDEX: 27.32 KG/M2 | SYSTOLIC BLOOD PRESSURE: 114 MMHG | HEIGHT: 65 IN | DIASTOLIC BLOOD PRESSURE: 62 MMHG

## 2021-08-06 PROBLEM — O26.899 RH NEGATIVE STATE IN ANTEPARTUM PERIOD: Status: RESOLVED | Noted: 2020-12-24 | Resolved: 2021-08-06

## 2021-08-06 PROBLEM — O34.13 UTERINE FIBROIDS AFFECTING PREGNANCY IN THIRD TRIMESTER: Status: RESOLVED | Noted: 2021-01-09 | Resolved: 2021-08-06

## 2021-08-06 PROBLEM — O34.219 PREVIOUS CESAREAN DELIVERY AFFECTING PREGNANCY, ANTEPARTUM: Status: RESOLVED | Noted: 2020-12-24 | Resolved: 2021-08-06

## 2021-08-06 PROBLEM — O34.219 HX SUCCESSFUL VBAC (VAGINAL BIRTH AFTER CESAREAN), CURRENTLY PREGNANT: Status: RESOLVED | Noted: 2020-12-24 | Resolved: 2021-08-06

## 2021-08-06 PROBLEM — Z67.91 RH NEGATIVE STATE IN ANTEPARTUM PERIOD: Status: RESOLVED | Noted: 2020-12-24 | Resolved: 2021-08-06

## 2021-08-06 PROBLEM — D25.9 UTERINE FIBROIDS AFFECTING PREGNANCY IN THIRD TRIMESTER: Status: RESOLVED | Noted: 2021-01-09 | Resolved: 2021-08-06

## 2021-08-06 PROBLEM — O09.523 MULTIGRAVIDA OF ADVANCED MATERNAL AGE IN THIRD TRIMESTER: Status: RESOLVED | Noted: 2020-12-24 | Resolved: 2021-08-06

## 2021-08-06 PROBLEM — O34.219 PATIENT DESIRES VAGINAL BIRTH AFTER CESAREAN SECTION (VBAC): Status: RESOLVED | Noted: 2020-12-24 | Resolved: 2021-08-06

## 2021-08-06 PROCEDURE — 99024 POSTOP FOLLOW-UP VISIT: CPT | Performed by: OBSTETRICS & GYNECOLOGY

## 2021-08-06 NOTE — PROGRESS NOTES
Assessment/Plan     Normal postpartum exam      1  Contraception: none  2  Annual exam due in May  3  Lactation consult, [address] information discussed  4  Increase activity as tolerated, may resume all normal activity  5  Anticipated return to work: 6 - 12 weeks post partum  Dada Kelly is a 45 y o  female who presents for a postpartum visit  She is 4 weeks postpartum following a spontaneous vaginal delivery  I have fully reviewed the prenatal and intrapartum course  The delivery was at 39w2 gestation  Anesthesia: epidural  Laceration: none  Bleeding no bleeding  Bowel function: normal  Bladder function: normal  Patient has notbeen sexually active  Desired contraception method is vasectomy - partner to schedule  Postpartum depression screening: negative  EPDS : 7    Pediatrician: [facility]  Baby is doing well  Sees cardiologist - holes never closed, echo in 3 weeks - if still patent, cath lab  Baby is feeding by breast/formula      Last Pap :  ; NILM, neg HPV  Gestational Diabetes: no  Pregnancy Complications: AMA, , fibroids    The following portions of the patient's history were reviewed and updated as appropriate: allergies, current medications, past family history, past medical history, past social history, past surgical history and problem list       Current Outpatient Medications:     levocetirizine (XYZAL) 5 MG tablet, Take 5 mg by mouth every evening, Disp: , Rfl:     Prenatal MV-Min-Fe Fum-FA-DHA (Prenatal Multi +DHA) 27-0 8-200 MG CAPS, Take 1 tablet by mouth daily, Disp: , Rfl:     acetaminophen (TYLENOL) 325 mg tablet, Take 2 tablets (650 mg total) by mouth every 6 (six) hours as needed for headaches (Patient not taking: Reported on 2021), Disp: , Rfl: 0    docusate sodium (COLACE) 250 MG capsule, Take 250 mg by mouth daily (Patient not taking: Reported on 2021), Disp: , Rfl:     Erythromycin 2 % PADS, Apply 1 application topically daily (Patient not taking: Reported on 8/6/2021), Disp: 60 each, Rfl: 11    ibuprofen (MOTRIN) 600 mg tablet, Take 1 tablet (600 mg total) by mouth every 6 (six) hours as needed for mild pain (Patient not taking: Reported on 8/6/2021), Disp: 30 tablet, Rfl: 0    Allergies   Allergen Reactions    Other Allergic Rhinitis     SEASONAL ALLERGIES       Review of Systems  Review of Systems   Constitutional: Negative for chills and fever  Respiratory: Negative for shortness of breath  Cardiovascular: Negative for chest pain and leg swelling  Gastrointestinal: Negative for abdominal distention and abdominal pain  Genitourinary: Negative for difficulty urinating, dysuria, frequency, vaginal bleeding and vaginal discharge  Neurological: Positive for headaches (last week)     Psychiatric/Behavioral: Dysphoric mood:          Objective      LMP 10/05/2020 (Exact Date)     OBGyn Exam

## 2021-09-09 ENCOUNTER — OFFICE VISIT (OUTPATIENT)
Dept: INTERNAL MEDICINE CLINIC | Facility: CLINIC | Age: 41
End: 2021-09-09
Payer: COMMERCIAL

## 2021-09-09 VITALS
DIASTOLIC BLOOD PRESSURE: 80 MMHG | BODY MASS INDEX: 27.32 KG/M2 | WEIGHT: 164 LBS | OXYGEN SATURATION: 98 % | TEMPERATURE: 97.9 F | HEIGHT: 65 IN | HEART RATE: 81 BPM | SYSTOLIC BLOOD PRESSURE: 125 MMHG

## 2021-09-09 DIAGNOSIS — F41.9 ANXIETY: ICD-10-CM

## 2021-09-09 DIAGNOSIS — F90.2 ATTENTION DEFICIT HYPERACTIVITY DISORDER (ADHD), COMBINED TYPE: Primary | ICD-10-CM

## 2021-09-09 PROCEDURE — 99204 OFFICE O/P NEW MOD 45 MIN: CPT | Performed by: INTERNAL MEDICINE

## 2021-09-09 NOTE — PROGRESS NOTES
Assessment/Plan:  Referral for her to be seen by Dr Madonna Cabrera sent   She will also start counseling  I had her sign a controlled substance agreement form if she is unable to be seen by psychiatry and I will need to start her on a stimulant  She was on Strattera but reports suicidal ideations on it  UDS performed today     Problem List Items Addressed This Visit     None      Visit Diagnoses     Attention deficit hyperactivity disorder (ADHD), combined type    -  Primary    Relevant Orders    Ambulatory referral to [facility]        Relevant Orders    Ambulatory referral to Behavioral Health            Subjective:      Patient ID: Jessica Roberts is a 45 y o  female  HPI  Here as a new patient   ADHD diagnosed in middle school and was on Ritalin  As an adult, her PCP placed on Strattera 60mg that she took for  > 1 year  This was > 10 years ago so cannot say for sure how much it helped but recalls suicidal thoughts when she took it   ADHD symptoms are worsening  She has poor concentration, disorganized, cannot multi task, easily distracted, misplaces things, procrastinates  She feels she is failing her daughters  She has  1- 11 yo 9 yo and 2months  Currently not breast feeding  + anxiety  Denies depression  Denies SI    The following portions of the patient's history were reviewed and updated as appropriate: allergies, current medications, past family history, past medical history, past social history, past surgical history and problem list     Review of Systems   Constitutional: Negative for appetite change, chills and fever  Respiratory: Negative for cough and shortness of breath  Cardiovascular: Negative for chest pain and palpitations  Gastrointestinal: Negative for abdominal pain, constipation and diarrhea  Genitourinary: Positive for menstrual problem (not had her period back yet since giving birth 2months ago)  Negative for difficulty urinating     Neurological: Negative for dizziness and headaches  Psychiatric/Behavioral: Negative for sleep disturbance  The patient is nervous/anxious  See hpi         Objective:      /80   Pulse 81   Temp 97 9 °F (36 6 °C) (Temporal)   Ht 5' 5" (1 651 m)   Wt 74 4 kg (164 lb)   SpO2 98%   BMI 27 29 kg/m²          Physical Exam  Constitutional:       General: She is not in acute distress  Appearance: She is well-developed  She is not ill-appearing, toxic-appearing or diaphoretic  HENT:      Head: Normocephalic and atraumatic  Eyes:      Conjunctiva/sclera: Conjunctivae normal    Cardiovascular:      Rate and Rhythm: Normal rate and regular rhythm  Heart sounds: Normal heart sounds  No murmur heard  Pulmonary:      Effort: Pulmonary effort is normal  No respiratory distress  Breath sounds: Normal breath sounds  No wheezing or rales  Abdominal:      General: There is no distension  Palpations: Abdomen is soft  There is no mass  Tenderness: There is no abdominal tenderness  There is no guarding or rebound  Musculoskeletal:      Cervical back: Neck supple  Right lower leg: No edema  Left lower leg: No edema  Skin:     General: Skin is warm and dry  Neurological:      Mental Status: She is alert and oriented to person, place, and time  Psychiatric:         Behavior: Behavior normal          Thought Content:  Thought content normal          Judgment: Judgment normal       Comments: PHQ2=2  GAD7=9

## 2021-09-13 ENCOUNTER — TELEPHONE (OUTPATIENT)
Dept: PSYCHIATRY | Facility: CLINIC | Age: 41
End: 2021-09-13

## 2021-09-13 NOTE — TELEPHONE ENCOUNTER
Behavorial Health Outpatient Intake Questions    Referred by: Dr Janay Irizarry    Please advised interviewee that they need to answer all questions truthfully to allow for best care and any misrepresentations of information may affect their ability to be seen at this clinic   => Was this discussed? Yes     Behavorial Health Outpatient Intake History -     Presenting Problem (in patient's words): ADHD    Are there any developmental disabilities? ? If yes, can they speak to you on the phone? If they are too limited to speak to you on phone, refer out No    Are you taking any psychiatric medications? No    => If yes, who prescribes? If yes, are they injectable medications? Does the patient have a language barrier or hearing impairment? No    Have you been treated at [facility] [place] [facility] by a therapist or a doctor in the past? If yes, who? No    Has the patient been hospitalized for mental health? No   If yes, how long ago was last hospitalization and where was it? Do you actively use alcohol or marijuana or illegal substances? If yes, what and how much - refer out to Drug and alcohol treatment if use is excessive or daily use of illegal substances No concerns of substance abuse are reported  Do you have a community treatment team or ? No    Legal History-     Does the patient have any history of arrests, assisted/FDC time, or DUIs? No  If Yes-  1) What types of charges? 2) When were they last incarcerated? 3) Are they currently on parole or probation? Minor Child-    Who has custody of the child? Is there a custody agreement? If there is a custody agreement remind parent that they must bring a copy to the first appt or they will not be seen       Intake Team, please check with provider before scheduling if flags come up such as:  - complex case  - legal history (other than DUI)  - communication barrier concerns are present  - if, in your judgment, this needs further review    ACCEPTED as a patient Yes => Appointment Date: 9/29/21 @ 11:00am    Referred Elsewhere? No    Name of Insurance Co:  Insurance ID#  [employer] #  If ins is primary or secondary  If patient is a minor, parents information such as Name, D  O B of guarantor

## 2021-09-13 NOTE — TELEPHONE ENCOUNTER
----- Message from Joshua Chavez MD sent at 9/9/2021  9:54 PM EDT -----  Regarding: [facility] patient to see Dr Jorge Luis Fields  This is a referral to Dr Jorge Luis Fields to see her at [place] [address] re: ADHD

## 2021-09-30 ENCOUNTER — TELEPHONE (OUTPATIENT)
Dept: INTERNAL MEDICINE CLINIC | Facility: CLINIC | Age: 41
End: 2021-09-30

## 2021-10-19 ENCOUNTER — OFFICE VISIT (OUTPATIENT)
Dept: INTERNAL MEDICINE CLINIC | Facility: CLINIC | Age: 41
End: 2021-10-19
Payer: COMMERCIAL

## 2021-10-19 VITALS
SYSTOLIC BLOOD PRESSURE: 110 MMHG | HEIGHT: 65 IN | DIASTOLIC BLOOD PRESSURE: 68 MMHG | OXYGEN SATURATION: 98 % | WEIGHT: 164 LBS | BODY MASS INDEX: 27.32 KG/M2 | HEART RATE: 83 BPM

## 2021-10-19 DIAGNOSIS — F41.1 ANXIETY STATE: ICD-10-CM

## 2021-10-19 DIAGNOSIS — F90.2 ATTENTION DEFICIT HYPERACTIVITY DISORDER (ADHD), COMBINED TYPE: Primary | ICD-10-CM

## 2021-10-19 PROCEDURE — 99213 OFFICE O/P EST LOW 20 MIN: CPT | Performed by: INTERNAL MEDICINE

## 2021-10-20 ENCOUNTER — OFFICE VISIT (OUTPATIENT)
Dept: BEHAVIORAL/MENTAL HEALTH CLINIC | Facility: CLINIC | Age: 41
End: 2021-10-20

## 2021-10-20 ENCOUNTER — TELEPHONE (OUTPATIENT)
Dept: INTERNAL MEDICINE CLINIC | Facility: CLINIC | Age: 41
End: 2021-10-20

## 2021-10-20 DIAGNOSIS — F90.9 ATTENTION DEFICIT HYPERACTIVITY DISORDER (ADHD), UNSPECIFIED ADHD TYPE: ICD-10-CM

## 2021-10-20 DIAGNOSIS — F41.1 GENERALIZED ANXIETY DISORDER: Primary | ICD-10-CM

## 2021-10-20 PROCEDURE — NC001 PR NO CHARGE: Performed by: PSYCHIATRY & NEUROLOGY

## 2021-10-20 RX ORDER — ESCITALOPRAM OXALATE 10 MG/1
TABLET ORAL
Qty: 33 TABLET | Refills: 0 | Status: SHIPPED | OUTPATIENT
Start: 2021-10-20 | End: 2021-11-17 | Stop reason: SDUPTHER

## 2021-11-17 ENCOUNTER — OFFICE VISIT (OUTPATIENT)
Dept: BEHAVIORAL/MENTAL HEALTH CLINIC | Facility: CLINIC | Age: 41
End: 2021-11-17

## 2021-11-17 DIAGNOSIS — F90.2 ATTENTION DEFICIT HYPERACTIVITY DISORDER (ADHD), COMBINED TYPE: ICD-10-CM

## 2021-11-17 DIAGNOSIS — F32.A DEPRESSIVE DISORDER: ICD-10-CM

## 2021-11-17 DIAGNOSIS — F41.1 GENERALIZED ANXIETY DISORDER: Primary | ICD-10-CM

## 2021-11-17 RX ORDER — ESCITALOPRAM OXALATE 10 MG/1
10 TABLET ORAL DAILY
Qty: 30 TABLET | Refills: 1 | Status: SHIPPED | OUTPATIENT
Start: 2021-11-17 | End: 2021-12-09

## 2021-11-17 RX ORDER — BUPROPION HYDROCHLORIDE 150 MG/1
150 TABLET ORAL DAILY
Qty: 30 TABLET | Refills: 0 | Status: SHIPPED | OUTPATIENT
Start: 2021-11-17 | End: 2021-12-09

## 2021-11-18 ENCOUNTER — TELEPHONE (OUTPATIENT)
Dept: PSYCHIATRY | Facility: CLINIC | Age: 41
End: 2021-11-18

## 2021-12-09 DIAGNOSIS — F90.2 ATTENTION DEFICIT HYPERACTIVITY DISORDER (ADHD), COMBINED TYPE: ICD-10-CM

## 2021-12-09 DIAGNOSIS — F41.1 GENERALIZED ANXIETY DISORDER: ICD-10-CM

## 2021-12-09 DIAGNOSIS — F32.A DEPRESSIVE DISORDER: ICD-10-CM

## 2021-12-09 RX ORDER — BUPROPION HYDROCHLORIDE 150 MG/1
TABLET ORAL
Qty: 30 TABLET | Refills: 0 | Status: SHIPPED | OUTPATIENT
Start: 2021-12-09 | End: 2021-12-20 | Stop reason: SINTOL

## 2021-12-09 RX ORDER — ESCITALOPRAM OXALATE 10 MG/1
TABLET ORAL
Qty: 30 TABLET | Refills: 1 | Status: SHIPPED | OUTPATIENT
Start: 2021-12-09 | End: 2022-01-06

## 2021-12-20 ENCOUNTER — OFFICE VISIT (OUTPATIENT)
Dept: PSYCHIATRY | Facility: CLINIC | Age: 41
End: 2021-12-20
Payer: COMMERCIAL

## 2021-12-20 DIAGNOSIS — F32.1 CURRENT MODERATE EPISODE OF MAJOR DEPRESSIVE DISORDER WITHOUT PRIOR EPISODE (HCC): ICD-10-CM

## 2021-12-20 DIAGNOSIS — F41.1 GENERALIZED ANXIETY DISORDER: ICD-10-CM

## 2021-12-20 DIAGNOSIS — F90.2 ATTENTION DEFICIT HYPERACTIVITY DISORDER (ADHD), COMBINED TYPE: Primary | ICD-10-CM

## 2021-12-20 PROCEDURE — 99213 OFFICE O/P EST LOW 20 MIN: CPT | Performed by: PSYCHIATRY & NEUROLOGY

## 2021-12-20 RX ORDER — DEXTROAMPHETAMINE SACCHARATE, AMPHETAMINE ASPARTATE, DEXTROAMPHETAMINE SULFATE AND AMPHETAMINE SULFATE 1.25; 1.25; 1.25; 1.25 MG/1; MG/1; MG/1; MG/1
5 TABLET ORAL 2 TIMES DAILY
Qty: 60 TABLET | Refills: 0 | Status: SHIPPED | OUTPATIENT
Start: 2021-12-20 | End: 2022-01-19 | Stop reason: SDUPTHER

## 2022-01-06 DIAGNOSIS — F41.1 GENERALIZED ANXIETY DISORDER: ICD-10-CM

## 2022-01-06 RX ORDER — ESCITALOPRAM OXALATE 10 MG/1
TABLET ORAL
Qty: 30 TABLET | Refills: 1 | Status: SHIPPED | OUTPATIENT
Start: 2022-01-06 | End: 2022-01-31 | Stop reason: SDUPTHER

## 2022-01-19 DIAGNOSIS — F90.2 ATTENTION DEFICIT HYPERACTIVITY DISORDER (ADHD), COMBINED TYPE: ICD-10-CM

## 2022-01-19 RX ORDER — DEXTROAMPHETAMINE SACCHARATE, AMPHETAMINE ASPARTATE, DEXTROAMPHETAMINE SULFATE AND AMPHETAMINE SULFATE 1.25; 1.25; 1.25; 1.25 MG/1; MG/1; MG/1; MG/1
5 TABLET ORAL 2 TIMES DAILY
Qty: 60 TABLET | Refills: 0 | Status: SHIPPED | OUTPATIENT
Start: 2022-01-19 | End: 2022-02-18 | Stop reason: SDUPTHER

## 2022-01-27 ENCOUNTER — LAB (OUTPATIENT)
Dept: LAB | Facility: CLINIC | Age: 42
End: 2022-01-27
Payer: COMMERCIAL

## 2022-01-27 DIAGNOSIS — F90.2 ATTENTION DEFICIT HYPERACTIVITY DISORDER (ADHD), COMBINED TYPE: ICD-10-CM

## 2022-01-27 PROCEDURE — 93005 ELECTROCARDIOGRAM TRACING: CPT

## 2022-01-28 LAB
ATRIAL RATE: 79 BPM
P AXIS: 68 DEGREES
PR INTERVAL: 140 MS
QRS AXIS: 49 DEGREES
QRSD INTERVAL: 70 MS
QT INTERVAL: 394 MS
QTC INTERVAL: 451 MS
T WAVE AXIS: 36 DEGREES
VENTRICULAR RATE: 79 BPM

## 2022-01-28 PROCEDURE — 93010 ELECTROCARDIOGRAM REPORT: CPT | Performed by: INTERNAL MEDICINE

## 2022-01-31 ENCOUNTER — OFFICE VISIT (OUTPATIENT)
Dept: PSYCHIATRY | Facility: CLINIC | Age: 42
End: 2022-01-31

## 2022-01-31 DIAGNOSIS — F41.1 GENERALIZED ANXIETY DISORDER: ICD-10-CM

## 2022-01-31 DIAGNOSIS — F90.2 ATTENTION DEFICIT HYPERACTIVITY DISORDER (ADHD), COMBINED TYPE: ICD-10-CM

## 2022-01-31 DIAGNOSIS — F32.1 CURRENT MODERATE EPISODE OF MAJOR DEPRESSIVE DISORDER WITHOUT PRIOR EPISODE (HCC): Primary | ICD-10-CM

## 2022-01-31 RX ORDER — ESCITALOPRAM OXALATE 10 MG/1
10 TABLET ORAL DAILY
Qty: 30 TABLET | Refills: 1 | Status: SHIPPED | OUTPATIENT
Start: 2022-01-31 | End: 2022-03-14 | Stop reason: SDUPTHER
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
## 2022-02-07 ENCOUNTER — OFFICE VISIT (OUTPATIENT)
Dept: INTERNAL MEDICINE CLINIC | Facility: CLINIC | Age: 42
End: 2022-02-07
Payer: COMMERCIAL

## 2022-02-07 VITALS
SYSTOLIC BLOOD PRESSURE: 108 MMHG | DIASTOLIC BLOOD PRESSURE: 66 MMHG | HEIGHT: 65 IN | BODY MASS INDEX: 22.82 KG/M2 | HEART RATE: 99 BPM | WEIGHT: 137 LBS | OXYGEN SATURATION: 99 % | RESPIRATION RATE: 16 BRPM | TEMPERATURE: 98 F

## 2022-02-07 DIAGNOSIS — Z12.31 ENCOUNTER FOR SCREENING MAMMOGRAM FOR BREAST CANCER: ICD-10-CM

## 2022-02-07 DIAGNOSIS — Z00.00 ANNUAL PHYSICAL EXAM: Primary | ICD-10-CM

## 2022-02-07 PROCEDURE — 99396 PREV VISIT EST AGE 40-64: CPT | Performed by: INTERNAL MEDICINE
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
## 2022-02-07 NOTE — PATIENT INSTRUCTIONS

## 2022-02-18 DIAGNOSIS — F90.2 ATTENTION DEFICIT HYPERACTIVITY DISORDER (ADHD), COMBINED TYPE: ICD-10-CM

## 2022-02-18 RX ORDER — DEXTROAMPHETAMINE SACCHARATE, AMPHETAMINE ASPARTATE, DEXTROAMPHETAMINE SULFATE AND AMPHETAMINE SULFATE 1.25; 1.25; 1.25; 1.25 MG/1; MG/1; MG/1; MG/1
5 TABLET ORAL 2 TIMES DAILY
Qty: 60 TABLET | Refills: 0 | Status: SHIPPED | OUTPATIENT
Start: 2022-02-18 | End: 2022-03-14 | Stop reason: DRUGHIGH

## 2022-02-18 NOTE — TELEPHONE ENCOUNTER
Patient is requesting her adderall be refilled  I explained to the patient that Dr Saurav Sosa will wait until her next follow up to increase her medication  Patient verbalized understanding

## 2022-02-18 NOTE — TELEPHONE ENCOUNTER
Pt Followed up wit pcp  Everything was good  Was wondering if you can increase meds like you have talked about  But she also needs a refill  Pt didn't give me the name of the med needing to be refilled   If you want to call her thank you

## 2022-03-14 ENCOUNTER — OFFICE VISIT (OUTPATIENT)
Dept: PSYCHIATRY | Facility: CLINIC | Age: 42
End: 2022-03-14

## 2022-03-14 DIAGNOSIS — F41.1 GENERALIZED ANXIETY DISORDER: ICD-10-CM

## 2022-03-14 DIAGNOSIS — F90.2 ATTENTION DEFICIT HYPERACTIVITY DISORDER (ADHD), COMBINED TYPE: Primary | ICD-10-CM

## 2022-03-14 DIAGNOSIS — F32.1 CURRENT MODERATE EPISODE OF MAJOR DEPRESSIVE DISORDER WITHOUT PRIOR EPISODE (HCC): ICD-10-CM

## 2022-03-14 RX ORDER — DEXTROAMPHETAMINE SACCHARATE, AMPHETAMINE ASPARTATE, DEXTROAMPHETAMINE SULFATE AND AMPHETAMINE SULFATE 2.5; 2.5; 2.5; 2.5 MG/1; MG/1; MG/1; MG/1
10 TABLET ORAL
Qty: 60 TABLET | Refills: 0 | Status: SHIPPED | OUTPATIENT
Start: 2022-03-14 | End: 2022-04-15 | Stop reason: SDUPTHER

## 2022-03-14 RX ORDER — ESCITALOPRAM OXALATE 10 MG/1
10 TABLET ORAL DAILY
Qty: 30 TABLET | Refills: 2 | Status: SHIPPED | OUTPATIENT
Start: 2022-03-14 | End: 2022-06-13 | Stop reason: SDUPTHER
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
## 2022-03-14 NOTE — BH TREATMENT PLAN
TREATMENT PLAN (Medication Management Only)        [facility]    Name/Date of Birth/MRN:  Gladis Yang 37 y o  1980 MRN: [id number]  Date of Treatment Plan: March 14, 2022  Diagnosis/Diagnoses:   1  Attention deficit hyperactivity disorder (ADHD), combined type    2  Generalized anxiety disorder    3  Major depressive disorder, single, moderate (HCC)      Strengths/Personal Resources for Self-Care:  Medication and treatment compliance, motivation for treatment, sense of importance of health and wellness, stable job  Area/Areas of need (in own words):  ADHD  1  Long Term Goal: "Improve ADHD symptoms"   Target Date: 180 days from treatment plan  Person/Persons responsible for completion of goal: Dr Camilo Delong and Self  2  Short Term Objective (s) - How will we reach this goal?:   A  Provider new recommended medication/dosage changes and/or continue medication(s):  Increase Adderall, continue Lexapro  B    Medication management follow-up appointments  Target Date: 6 months from treatment plan unless noted otherwise  Person/Persons Responsible for Completion of Goal: Dr Camilo Delong and Self   Progress Towards Goals: continuing treatment, progressing   Treatment Modality: Medication management and therapy PRN  Review due 180 days from date of this plan: Approximately 6 months from today ( 9/14/2022 )    Expected length of service: ongoing treatment unless revised  My Physician/PA/NP and I have developed this plan together and I agree to work on the goals and objectives  I understand the treatment goals that were developed for my treatment    Signature:       Date and time:  Signature of parent/guardian if under age of 15 years: Date and time:  Signature of provider:      Date and time:  Signature of Supervising Physician:    Date and time: 3/14/2022      Cuate Mcdaniel,      Treatment Plan done but not signed at time of office visit due to:  Plan reviewed by phone or in person  and verbal consent given due to [address] distancing MDR UTI

## 2022-04-15 DIAGNOSIS — F90.2 ATTENTION DEFICIT HYPERACTIVITY DISORDER (ADHD), COMBINED TYPE: ICD-10-CM

## 2022-04-15 RX ORDER — DEXTROAMPHETAMINE SACCHARATE, AMPHETAMINE ASPARTATE, DEXTROAMPHETAMINE SULFATE AND AMPHETAMINE SULFATE 2.5; 2.5; 2.5; 2.5 MG/1; MG/1; MG/1; MG/1
10 TABLET ORAL
Qty: 60 TABLET | Refills: 0 | Status: SHIPPED | OUTPATIENT
Start: 2022-04-15 | End: 2022-04-25 | Stop reason: SDUPTHER

## 2022-04-15 NOTE — TELEPHONE ENCOUNTER
PDMP website reviewed  Ane Hui has been appropriately adherent to controlled psychotropic medications without evidence of abuse or misuse  As such, will send 30-day refill to pharmacy of choice and follow up as necessary

## 2022-04-25 ENCOUNTER — OFFICE VISIT (OUTPATIENT)
Dept: PSYCHIATRY | Facility: CLINIC | Age: 42
End: 2022-04-25

## 2022-04-25 DIAGNOSIS — F32.1 CURRENT MODERATE EPISODE OF MAJOR DEPRESSIVE DISORDER WITHOUT PRIOR EPISODE (HCC): ICD-10-CM

## 2022-04-25 DIAGNOSIS — F41.1 GENERALIZED ANXIETY DISORDER: ICD-10-CM

## 2022-04-25 DIAGNOSIS — F90.2 ATTENTION DEFICIT HYPERACTIVITY DISORDER (ADHD), COMBINED TYPE: Primary | ICD-10-CM

## 2022-04-25 RX ORDER — DEXTROAMPHETAMINE SACCHARATE, AMPHETAMINE ASPARTATE, DEXTROAMPHETAMINE SULFATE AND AMPHETAMINE SULFATE 2.5; 2.5; 2.5; 2.5 MG/1; MG/1; MG/1; MG/1
TABLET ORAL
Qty: 30 TABLET | Refills: 0 | Status: SHIPPED | OUTPATIENT
Start: 2022-04-25 | End: 2022-04-25

## 2022-04-25 RX ORDER — DEXTROAMPHETAMINE SACCHARATE, AMPHETAMINE ASPARTATE, DEXTROAMPHETAMINE SULFATE AND AMPHETAMINE SULFATE 2.5; 2.5; 2.5; 2.5 MG/1; MG/1; MG/1; MG/1
TABLET ORAL
Qty: 30 TABLET | Refills: 0 | Status: SHIPPED | OUTPATIENT
Start: 2022-05-30 | End: 2022-06-29 | Stop reason: SDUPTHER

## 2022-04-25 RX ORDER — DEXTROAMPHETAMINE SACCHARATE, AMPHETAMINE ASPARTATE MONOHYDRATE, DEXTROAMPHETAMINE SULFATE AND AMPHETAMINE SULFATE 2.5; 2.5; 2.5; 2.5 MG/1; MG/1; MG/1; MG/1
10 CAPSULE, EXTENDED RELEASE ORAL EVERY MORNING
Qty: 30 CAPSULE | Refills: 0 | Status: SHIPPED | OUTPATIENT
Start: 2022-04-25 | End: 2022-05-24 | Stop reason: SDUPTHER
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
## 2022-05-03 ENCOUNTER — TELEPHONE (OUTPATIENT)
Dept: OBGYN CLINIC | Facility: CLINIC | Age: 42
End: 2022-05-03

## 2022-05-03 DIAGNOSIS — N76.0 BACTERIAL VAGINOSIS: Primary | ICD-10-CM

## 2022-05-03 DIAGNOSIS — B96.89 BACTERIAL VAGINOSIS: Primary | ICD-10-CM

## 2022-05-03 RX ORDER — METRONIDAZOLE 500 MG/1
500 TABLET ORAL 2 TIMES DAILY
Qty: 14 TABLET | Refills: 0 | Status: SHIPPED | OUTPATIENT
Start: 2022-05-03 | End: 2022-05-10

## 2022-05-03 NOTE — TELEPHONE ENCOUNTER
Pt  Requesting oral metronidazole for tx of BV  C/o fishy odor and vaginal d/c   Has had BV in past      Pt  Last seen 2021, scheduled for yearly 7/2022

## 2022-05-24 DIAGNOSIS — F90.2 ATTENTION DEFICIT HYPERACTIVITY DISORDER (ADHD), COMBINED TYPE: ICD-10-CM

## 2022-05-24 RX ORDER — DEXTROAMPHETAMINE SACCHARATE, AMPHETAMINE ASPARTATE MONOHYDRATE, DEXTROAMPHETAMINE SULFATE AND AMPHETAMINE SULFATE 2.5; 2.5; 2.5; 2.5 MG/1; MG/1; MG/1; MG/1
10 CAPSULE, EXTENDED RELEASE ORAL EVERY MORNING
Qty: 30 CAPSULE | Refills: 0 | Status: SHIPPED | OUTPATIENT
Start: 2022-05-24 | End: 2022-06-13

## 2022-05-24 NOTE — TELEPHONE ENCOUNTER
PDMP website reviewed  Ramo Silva has been appropriately adherent to controlled psychotropic medications without evidence of abuse or misuse  As such, will send 30-day refill to pharmacy of choice and follow up as necessary

## 2022-06-13 ENCOUNTER — OFFICE VISIT (OUTPATIENT)
Dept: PSYCHIATRY | Facility: CLINIC | Age: 42
End: 2022-06-13

## 2022-06-13 DIAGNOSIS — F32.1 CURRENT MODERATE EPISODE OF MAJOR DEPRESSIVE DISORDER WITHOUT PRIOR EPISODE (HCC): ICD-10-CM

## 2022-06-13 DIAGNOSIS — F41.1 GENERALIZED ANXIETY DISORDER: ICD-10-CM

## 2022-06-13 DIAGNOSIS — F90.2 ATTENTION DEFICIT HYPERACTIVITY DISORDER (ADHD), COMBINED TYPE: Primary | ICD-10-CM

## 2022-06-13 RX ORDER — ESCITALOPRAM OXALATE 10 MG/1
10 TABLET ORAL DAILY
Qty: 30 TABLET | Refills: 2 | Status: SHIPPED | OUTPATIENT
Start: 2022-06-13

## 2022-06-13 RX ORDER — DEXTROAMPHETAMINE SACCHARATE, AMPHETAMINE ASPARTATE MONOHYDRATE, DEXTROAMPHETAMINE SULFATE AND AMPHETAMINE SULFATE 3.75; 3.75; 3.75; 3.75 MG/1; MG/1; MG/1; MG/1
15 CAPSULE, EXTENDED RELEASE ORAL EVERY MORNING
Qty: 30 CAPSULE | Refills: 0 | Status: SHIPPED | OUTPATIENT
Start: 2022-06-22 | End: 2022-06-24 | Stop reason: SDUPTHER
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
## 2022-06-24 DIAGNOSIS — F90.2 ATTENTION DEFICIT HYPERACTIVITY DISORDER (ADHD), COMBINED TYPE: ICD-10-CM

## 2022-06-24 RX ORDER — DEXTROAMPHETAMINE SACCHARATE, AMPHETAMINE ASPARTATE MONOHYDRATE, DEXTROAMPHETAMINE SULFATE AND AMPHETAMINE SULFATE 3.75; 3.75; 3.75; 3.75 MG/1; MG/1; MG/1; MG/1
15 CAPSULE, EXTENDED RELEASE ORAL EVERY MORNING
Qty: 30 CAPSULE | Refills: 0 | Status: SHIPPED | OUTPATIENT
Start: 2022-06-24 | End: 2022-07-26 | Stop reason: SDUPTHER

## 2022-06-24 NOTE — TELEPHONE ENCOUNTER
[facility] did not have in stock please resend to [facility] [address] its a national shortage she only has 1 pill and has been waiting a few days please review, thank you

## 2022-06-28 DIAGNOSIS — F90.2 ATTENTION DEFICIT HYPERACTIVITY DISORDER (ADHD), COMBINED TYPE: ICD-10-CM

## 2022-06-28 RX ORDER — DEXTROAMPHETAMINE SACCHARATE, AMPHETAMINE ASPARTATE, DEXTROAMPHETAMINE SULFATE AND AMPHETAMINE SULFATE 2.5; 2.5; 2.5; 2.5 MG/1; MG/1; MG/1; MG/1
TABLET ORAL
Qty: 30 TABLET | Refills: 0 | OUTPATIENT
Start: 2022-06-28

## 2022-06-29 RX ORDER — DEXTROAMPHETAMINE SACCHARATE, AMPHETAMINE ASPARTATE, DEXTROAMPHETAMINE SULFATE AND AMPHETAMINE SULFATE 2.5; 2.5; 2.5; 2.5 MG/1; MG/1; MG/1; MG/1
TABLET ORAL
Qty: 30 TABLET | Refills: 0 | Status: SHIPPED | OUTPATIENT
Start: 2022-06-29 | End: 2022-07-26 | Stop reason: SDUPTHER

## 2022-06-29 NOTE — TELEPHONE ENCOUNTER
Per PDMP she filled a 30 day supply of Adderall ER on 6/24/22 which I prescribed that day  Her next fill will not be due until late July, at which time her regular provider will be back in the office to evaluate need for refill  It looks like she may need her IR adderall filled, as this was last filled 6/2, but she should not be out of this yet as a 30-day supply was filled 6/2  I will send a refill of the IR

## 2022-06-29 NOTE — TELEPHONE ENCOUNTER
Melia Fu MD  to [address], DO    KM      4:04 PM  I sent a refill of the Adderall IR  Can you please call the patient back to let her know the IR is being refilled and that she should not be taking more than 15mg ER and 10mg IR of Adderall daily? Thank you

## 2022-06-29 NOTE — TELEPHONE ENCOUNTER
Called Margarita and left a VM:  Covering provider reviewed her request; long acting medication was filled 6/24/22; immediate release medication was prescribed 6/2/22 and can be filled at the end of the week; covering provider did send a prescription for IR to be filled when it is due; Should be taking IR 10 mg once a day in the afternoon; nursing number given to call with questions

## 2022-06-29 NOTE — TELEPHONE ENCOUNTER
Patient called the office inquiring about her medication that was denied  She is requesting a call back about the reason for the denial  She states she is out of her medication

## 2022-07-26 DIAGNOSIS — F90.2 ATTENTION DEFICIT HYPERACTIVITY DISORDER (ADHD), COMBINED TYPE: ICD-10-CM

## 2022-07-26 RX ORDER — DEXTROAMPHETAMINE SACCHARATE, AMPHETAMINE ASPARTATE, DEXTROAMPHETAMINE SULFATE AND AMPHETAMINE SULFATE 2.5; 2.5; 2.5; 2.5 MG/1; MG/1; MG/1; MG/1
TABLET ORAL
Qty: 30 TABLET | Refills: 0 | Status: SHIPPED | OUTPATIENT
Start: 2022-07-27 | End: 2022-08-23 | Stop reason: SDUPTHER

## 2022-07-26 RX ORDER — DEXTROAMPHETAMINE SACCHARATE, AMPHETAMINE ASPARTATE MONOHYDRATE, DEXTROAMPHETAMINE SULFATE AND AMPHETAMINE SULFATE 3.75; 3.75; 3.75; 3.75 MG/1; MG/1; MG/1; MG/1
15 CAPSULE, EXTENDED RELEASE ORAL EVERY MORNING
Qty: 30 CAPSULE | Refills: 0 | Status: SHIPPED | OUTPATIENT
Start: 2022-07-26 | End: 2022-08-23 | Stop reason: SDUPTHER

## 2022-07-26 NOTE — TELEPHONE ENCOUNTER
PDMP website reviewed  Eldon Aguilar has been appropriately adherent to controlled psychotropic medications without evidence of abuse or misuse  As such, will send 30-day refill to pharmacy of choice and follow up as necessary

## 2022-08-23 DIAGNOSIS — F90.2 ATTENTION DEFICIT HYPERACTIVITY DISORDER (ADHD), COMBINED TYPE: ICD-10-CM

## 2022-08-24 RX ORDER — DEXTROAMPHETAMINE SACCHARATE, AMPHETAMINE ASPARTATE, DEXTROAMPHETAMINE SULFATE AND AMPHETAMINE SULFATE 2.5; 2.5; 2.5; 2.5 MG/1; MG/1; MG/1; MG/1
TABLET ORAL
Qty: 30 TABLET | Refills: 0 | Status: SHIPPED | OUTPATIENT
Start: 2022-08-24 | End: 2022-09-21 | Stop reason: SDUPTHER

## 2022-08-24 RX ORDER — DEXTROAMPHETAMINE SACCHARATE, AMPHETAMINE ASPARTATE MONOHYDRATE, DEXTROAMPHETAMINE SULFATE AND AMPHETAMINE SULFATE 3.75; 3.75; 3.75; 3.75 MG/1; MG/1; MG/1; MG/1
15 CAPSULE, EXTENDED RELEASE ORAL EVERY MORNING
Qty: 30 CAPSULE | Refills: 0 | Status: SHIPPED | OUTPATIENT
Start: 2022-08-24 | End: 2022-08-26 | Stop reason: SDUPTHER

## 2022-08-24 NOTE — TELEPHONE ENCOUNTER
PDMP website reviewed  Ladanrudy Garcia has been appropriately adherent to controlled psychotropic medications without evidence of abuse or misuse  As such, will send 30-day refill to pharmacy of choice and follow up as necessary

## 2022-08-26 DIAGNOSIS — F90.2 ATTENTION DEFICIT HYPERACTIVITY DISORDER (ADHD), COMBINED TYPE: ICD-10-CM

## 2022-08-26 RX ORDER — DEXTROAMPHETAMINE SACCHARATE, AMPHETAMINE ASPARTATE MONOHYDRATE, DEXTROAMPHETAMINE SULFATE AND AMPHETAMINE SULFATE 3.75; 3.75; 3.75; 3.75 MG/1; MG/1; MG/1; MG/1
15 CAPSULE, EXTENDED RELEASE ORAL EVERY MORNING
Qty: 30 CAPSULE | Refills: 0 | Status: SHIPPED | OUTPATIENT
Start: 2022-08-26 | End: 2022-09-21 | Stop reason: SDUPTHER

## 2022-08-26 NOTE — TELEPHONE ENCOUNTER
Called [facility] pharmacy [facility] [address] ([phone]) twice to confirm that Adderall XR 15 mg was out of stock  Both times was unable to speak to pharmacy staff as line went to voicemail  Per PDMP patient has not filled either of the Adderall XR or IR prescriptions sent on 08/24/2022  PDMP website reviewed  Lanett Arm has been appropriately adherent to controlled psychotropic medications without evidence of abuse or misuse  Refill for Adderall XR 15 mg every morning was sent to [facility] [place] - [address], thus discontinuing Adderall XR 15 mg every morning sent to [facility] [place]  [address] on 8/24/22 (listed per Epic)  Called [facility] [place]  [address] and was unable to speak to pharmacy staff  Left voicemail discussing the above and with request to discontinue Adderall XR 15 mg sent on 08/24/2022 (if this was not already done so automatically electronically)   Left office phone number to call back with any questions

## 2022-09-08 DIAGNOSIS — F41.1 GENERALIZED ANXIETY DISORDER: ICD-10-CM

## 2022-09-08 DIAGNOSIS — F32.1 CURRENT MODERATE EPISODE OF MAJOR DEPRESSIVE DISORDER WITHOUT PRIOR EPISODE (HCC): ICD-10-CM

## 2022-09-08 RX ORDER — ESCITALOPRAM OXALATE 10 MG/1
TABLET ORAL
Qty: 90 TABLET | Refills: 0 | Status: SHIPPED | OUTPATIENT
Start: 2022-09-08

## 2022-09-15 ENCOUNTER — APPOINTMENT (OUTPATIENT)
Dept: LAB | Age: 42
End: 2022-09-15

## 2022-09-15 DIAGNOSIS — Z01.84 IMMUNITY STATUS TESTING: ICD-10-CM

## 2022-09-15 PROCEDURE — 86787 VARICELLA-ZOSTER ANTIBODY: CPT

## 2022-09-15 PROCEDURE — 86706 HEP B SURFACE ANTIBODY: CPT

## 2022-09-16 LAB
HBV SURFACE AB SER-ACNC: 885.31 MIU/ML
VZV IGG SER QL IA: NORMAL

## 2022-09-21 DIAGNOSIS — F90.2 ATTENTION DEFICIT HYPERACTIVITY DISORDER (ADHD), COMBINED TYPE: ICD-10-CM

## 2022-09-21 NOTE — TELEPHONE ENCOUNTER
Contacted PT to inform of provider's previous note regarding the electronic prescribing system  Pt stated that she will be out of the Adderall 10mg as of 9/23/22  Pt would like to be notified if medication can not be approved and sent by 9/23/22

## 2022-09-21 NOTE — TELEPHONE ENCOUNTER
Medication Refill Request     Name of Medication adderall XR  Dose/Frequency 15mg once a day  Quantity 30 capsule  Verified pharmacy   [x]  Verified ordering Provider   [x]  Does patient have enough for the next 3 days? Yes [x] No []  Does patient have a follow-up appointment scheduled? Yes [x] No []   If so when is appointment: 10/3/22    Medication Refill Request     Name of Medication adderall  Dose/Frequency 10mg  Quantity 30 tablets  Verified pharmacy   [x]  Verified ordering Provider   [x]  Does patient have enough for the next 3 days? Yes [] No [x]  Does patient have a follow-up appointment scheduled?  Yes [x] No []   If so when is appointment: 10/3/22

## 2022-09-21 NOTE — TELEPHONE ENCOUNTER
Please contact patient stating that the electronic prescribing system is currently not working and that as these medications are controlled substances they cannot be prescribed by a different method, and that will send refills when the system is running again

## 2022-09-22 RX ORDER — DEXTROAMPHETAMINE SACCHARATE, AMPHETAMINE ASPARTATE, DEXTROAMPHETAMINE SULFATE AND AMPHETAMINE SULFATE 2.5; 2.5; 2.5; 2.5 MG/1; MG/1; MG/1; MG/1
TABLET ORAL
Qty: 30 TABLET | Refills: 0 | Status: SHIPPED | OUTPATIENT
Start: 2022-09-22 | End: 2022-10-17 | Stop reason: DRUGHIGH

## 2022-09-22 RX ORDER — DEXTROAMPHETAMINE SACCHARATE, AMPHETAMINE ASPARTATE MONOHYDRATE, DEXTROAMPHETAMINE SULFATE AND AMPHETAMINE SULFATE 3.75; 3.75; 3.75; 3.75 MG/1; MG/1; MG/1; MG/1
15 CAPSULE, EXTENDED RELEASE ORAL EVERY MORNING
Qty: 30 CAPSULE | Refills: 0 | Status: SHIPPED | OUTPATIENT
Start: 2022-09-22 | End: 2022-10-17 | Stop reason: DRUGHIGH

## 2022-09-22 NOTE — TELEPHONE ENCOUNTER
PDMP website reviewed  Kendy Nguyen has been appropriately adherent to controlled psychotropic medications without evidence of abuse or misuse  As such, will send 30-day refill to pharmacy of choice and follow up as necessary

## 2022-10-06 ENCOUNTER — APPOINTMENT (OUTPATIENT)
Dept: LAB | Age: 42
End: 2022-10-06
Payer: COMMERCIAL

## 2022-10-06 DIAGNOSIS — Z11.1 SCREENING EXAMINATION FOR PULMONARY TUBERCULOSIS: ICD-10-CM

## 2022-10-06 PROCEDURE — 86480 TB TEST CELL IMMUN MEASURE: CPT

## 2022-10-06 PROCEDURE — 36415 COLL VENOUS BLD VENIPUNCTURE: CPT

## 2022-10-08 LAB
GAMMA INTERFERON BACKGROUND BLD IA-ACNC: 0.03 IU/ML
M TB IFN-G BLD-IMP: NEGATIVE
M TB IFN-G CD4+ BCKGRND COR BLD-ACNC: -0.02 IU/ML
M TB IFN-G CD4+ BCKGRND COR BLD-ACNC: 0 IU/ML
MITOGEN IGNF BCKGRD COR BLD-ACNC: >10 IU/ML

## 2022-10-17 ENCOUNTER — OFFICE VISIT (OUTPATIENT)
Dept: PSYCHIATRY | Facility: CLINIC | Age: 42
End: 2022-10-17

## 2022-10-17 DIAGNOSIS — F32.1 CURRENT MODERATE EPISODE OF MAJOR DEPRESSIVE DISORDER WITHOUT PRIOR EPISODE (HCC): ICD-10-CM

## 2022-10-17 DIAGNOSIS — F41.1 GENERALIZED ANXIETY DISORDER: ICD-10-CM

## 2022-10-17 DIAGNOSIS — F90.2 ATTENTION DEFICIT HYPERACTIVITY DISORDER (ADHD), COMBINED TYPE: Primary | ICD-10-CM

## 2022-10-17 PROCEDURE — NC001 PR NO CHARGE: Performed by: PSYCHIATRY & NEUROLOGY

## 2022-10-17 RX ORDER — DEXTROAMPHETAMINE SACCHARATE, AMPHETAMINE ASPARTATE MONOHYDRATE, DEXTROAMPHETAMINE SULFATE AND AMPHETAMINE SULFATE 3.75; 3.75; 3.75; 3.75 MG/1; MG/1; MG/1; MG/1
15 CAPSULE, EXTENDED RELEASE ORAL EVERY MORNING
Qty: 30 CAPSULE | Refills: 0 | Status: SHIPPED | OUTPATIENT
Start: 2022-10-17

## 2022-10-17 RX ORDER — DEXTROAMPHETAMINE SACCHARATE, AMPHETAMINE ASPARTATE, DEXTROAMPHETAMINE SULFATE AND AMPHETAMINE SULFATE 3.75; 3.75; 3.75; 3.75 MG/1; MG/1; MG/1; MG/1
TABLET ORAL
Qty: 30 TABLET | Refills: 0 | Status: SHIPPED | OUTPATIENT
Start: 2022-10-17
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
## 2022-10-17 NOTE — BH TREATMENT PLAN
TREATMENT PLAN (Medication Management Only)        [facility]    Name/Date of Birth/MRN:  Cristel Dempsey 37 y o  1980 MRN: [id number]  Date of Treatment Plan: October 17, 2022  Diagnosis/Diagnoses:   1  Attention deficit hyperactivity disorder (ADHD), combined type    2  Generalized anxiety disorder    3  Major depressive disorder, single, moderate (HCC)      Strengths/Personal Resources for Self-Care: Medication and treatment compliance, motivation for treatment, sense of importance of health and wellness, stable job, in [address] of need (in own words): "ADHD, anxiety"  1  Long Term Goal: "Improve ADHD and anxiety"   Target Date: 180 days from treatment plan  Person/Persons responsible for completion of goal: Dr Dara Scott and Self  2  Short Term Objective (s) - How will we reach this goal?:   A  Provider new recommended medication/dosage changes and/or continue medication(s): Increase Adderall, continue Lexapro  B   Attend medication management follow up appointments  Target Date: 6 months from treatment plan unless noted otherwise  Person/Persons Responsible for Completion of Goal: Dr Dara Scott and Self   Progress Towards Goals: continuing treatment, progressing   Treatment Modality: Medication management and therapy PRN  Review due 180 days from date of this plan: Approximately 6 months from today ( 4/17/2023 )    Expected length of service: ongoing treatment unless revised  My Physician/PA/NP and I have developed this plan together and I agree to work on the goals and objectives  I understand the treatment goals that were developed for my treatment    Signature:       Date and time:  Signature of parent/guardian if under age of 15 years: Date and time:  Signature of provider:      Date and time:  Signature of Supervising Physician:    Date and time: 10/17/2022      [address] [place] [address] done but not signed at time of office visit due to:  Plan reviewed by phone or in person  and verbal consent given due to [address] distancing

## 2022-10-17 NOTE — PATIENT INSTRUCTIONS
Please present for your scheduled follow-up appointment approximately 15 minutes prior to appointment time  If you are running late or are unable to attend your appointment, please call our [place] office at [phone] to notify the office of your absence  If you are in psychological crisis including not limited to suicidal/homicidal thoughts or thoughts of self-injury, do not hesitate to call/contact your [facility] hotline (see below) or go to the nearest emergency department    [facility] Crisis: [address] [facility] Crisis: [phone]  [address] Crisis: [address] Crisis: [address] Crisis: [address] Crisis: [address] [facility] Crisis: [phone]  National Suicide Prevention Hotline: 3-544.236.1084

## 2022-11-21 DIAGNOSIS — F90.2 ATTENTION DEFICIT HYPERACTIVITY DISORDER (ADHD), COMBINED TYPE: ICD-10-CM

## 2022-11-21 RX ORDER — DEXTROAMPHETAMINE SACCHARATE, AMPHETAMINE ASPARTATE, DEXTROAMPHETAMINE SULFATE AND AMPHETAMINE SULFATE 3.75; 3.75; 3.75; 3.75 MG/1; MG/1; MG/1; MG/1
TABLET ORAL
Qty: 30 TABLET | Refills: 0 | Status: SHIPPED | OUTPATIENT
Start: 2022-11-21

## 2022-11-21 RX ORDER — DEXTROAMPHETAMINE SACCHARATE, AMPHETAMINE ASPARTATE MONOHYDRATE, DEXTROAMPHETAMINE SULFATE AND AMPHETAMINE SULFATE 3.75; 3.75; 3.75; 3.75 MG/1; MG/1; MG/1; MG/1
15 CAPSULE, EXTENDED RELEASE ORAL EVERY MORNING
Qty: 30 CAPSULE | Refills: 0 | Status: SHIPPED | OUTPATIENT
Start: 2022-11-21

## 2022-11-21 NOTE — TELEPHONE ENCOUNTER
Medication Refill Request     Name of Medication ADDERALL XR  Dose/Frequency 15 mg/ Take 1 capsule(15 mg total) by mouth everything morning  Quantity 30  Verified pharmacy   [x]  Verified ordering Provider   [x]  Does patient have enough for the next 3 days? Yes [x] No []  Does patient have a follow-up appointment scheduled? Yes [x] No []   If so when is appointment: 12/5/2022      Medication Refill Request     Name of Medication ADDERALL  Dose/Frequency 15 mg/ Take 1 tablet(15 mg) daily in the afternoon   Quantity 30  Verified pharmacy   [x]  Verified ordering Provider   [x]  Does patient have enough for the next 3 days? Yes [x] No []  Does patient have a follow-up appointment scheduled?  Yes [x] No []   If so when is appointment: 12/5/2022

## 2022-11-21 NOTE — TELEPHONE ENCOUNTER
PDMP website reviewed  Aleah Wood has been appropriately adherent to controlled psychotropic medications (Adderall) without evidence of abuse or misuse  As such, will send 30-day refill to pharmacy of choice and follow up as necessary

## 2022-12-05 ENCOUNTER — OFFICE VISIT (OUTPATIENT)
Dept: PSYCHIATRY | Facility: CLINIC | Age: 42
End: 2022-12-05

## 2022-12-05 DIAGNOSIS — F32.1 CURRENT MODERATE EPISODE OF MAJOR DEPRESSIVE DISORDER WITHOUT PRIOR EPISODE (HCC): ICD-10-CM

## 2022-12-05 DIAGNOSIS — F41.1 GENERALIZED ANXIETY DISORDER: ICD-10-CM

## 2022-12-05 DIAGNOSIS — F90.2 ATTENTION DEFICIT HYPERACTIVITY DISORDER (ADHD), COMBINED TYPE: Primary | ICD-10-CM

## 2022-12-05 RX ORDER — ESCITALOPRAM OXALATE 10 MG/1
10 TABLET ORAL DAILY
Qty: 90 TABLET | Refills: 0 | Status: SHIPPED | OUTPATIENT
Start: 2022-12-05
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
## 2022-12-05 NOTE — PATIENT INSTRUCTIONS
Please present for your scheduled follow-up appointment approximately 15 minutes prior to appointment time  If you are running late or are unable to attend your appointment, please call our [place] office at [phone] to notify the office of your absence  If you are in psychological crisis including not limited to suicidal/homicidal thoughts or thoughts of self-injury, do not hesitate to call/contact your [facility] hotline (see below) or go to the nearest emergency department    [facility] Crisis: [address] [facility] Crisis: [phone]  [address] Crisis: [address] Crisis: [address] Crisis: [address] Crisis: [address]  Crisis: [phone]  National Suicide Prevention Hotline: 1-329.428.5809

## 2022-12-19 DIAGNOSIS — F90.2 ATTENTION DEFICIT HYPERACTIVITY DISORDER (ADHD), COMBINED TYPE: ICD-10-CM

## 2022-12-19 NOTE — TELEPHONE ENCOUNTER
Medication Refill Request     Name of Medication Adderall XR  Dose/Frequency 15 mg 1 tablet  Quantity 30  Verified pharmacy   [x]  Verified ordering Provider   [x]  Does patient have enough for the next 3 days? Yes [] No [x]  Does patient have a follow-up appointment scheduled? Yes [x] No []   If so when is appointment: 1/30    Medication Refill Request     Name of Medication Adderall   Dose/Frequency 15 mg 1 tablet  Quantity 30  Verified pharmacy   [x]  Verified ordering Provider   [x]  Does patient have enough for the next 3 days? Yes [] No [x]  Does patient have a follow-up appointment scheduled?  Yes [x] No []   If so when is appointment: 1/30

## 2022-12-20 RX ORDER — DEXTROAMPHETAMINE SACCHARATE, AMPHETAMINE ASPARTATE MONOHYDRATE, DEXTROAMPHETAMINE SULFATE AND AMPHETAMINE SULFATE 3.75; 3.75; 3.75; 3.75 MG/1; MG/1; MG/1; MG/1
15 CAPSULE, EXTENDED RELEASE ORAL EVERY MORNING
Qty: 30 CAPSULE | Refills: 0 | Status: SHIPPED | OUTPATIENT
Start: 2022-12-20

## 2022-12-20 RX ORDER — DEXTROAMPHETAMINE SACCHARATE, AMPHETAMINE ASPARTATE, DEXTROAMPHETAMINE SULFATE AND AMPHETAMINE SULFATE 3.75; 3.75; 3.75; 3.75 MG/1; MG/1; MG/1; MG/1
TABLET ORAL
Qty: 30 TABLET | Refills: 0 | Status: SHIPPED | OUTPATIENT
Start: 2022-12-20

## 2022-12-20 NOTE — TELEPHONE ENCOUNTER
PDMP website reviewed  Sarah Dunn has been appropriately adherent to controlled psychotropic medications (Adderall) without evidence of abuse or misuse  As such, will send 30-day refill to pharmacy of choice and follow up as necessary

## 2023-01-17 DIAGNOSIS — F90.2 ATTENTION DEFICIT HYPERACTIVITY DISORDER (ADHD), COMBINED TYPE: ICD-10-CM

## 2023-01-17 RX ORDER — DEXTROAMPHETAMINE SACCHARATE, AMPHETAMINE ASPARTATE MONOHYDRATE, DEXTROAMPHETAMINE SULFATE AND AMPHETAMINE SULFATE 3.75; 3.75; 3.75; 3.75 MG/1; MG/1; MG/1; MG/1
15 CAPSULE, EXTENDED RELEASE ORAL EVERY MORNING
Qty: 30 CAPSULE | Refills: 0 | Status: SHIPPED | OUTPATIENT
Start: 2023-01-17

## 2023-01-17 RX ORDER — DEXTROAMPHETAMINE SACCHARATE, AMPHETAMINE ASPARTATE, DEXTROAMPHETAMINE SULFATE AND AMPHETAMINE SULFATE 3.75; 3.75; 3.75; 3.75 MG/1; MG/1; MG/1; MG/1
TABLET ORAL
Qty: 30 TABLET | Refills: 0 | Status: SHIPPED | OUTPATIENT
Start: 2023-01-17

## 2023-01-17 NOTE — TELEPHONE ENCOUNTER
PDMP website reviewed  Carmen Levyver has been appropriately adherent to controlled psychotropic medications (Adderall) without evidence of abuse or misuse  As such, will send 30-day refill to pharmacy of choice and follow up as necessary

## 2023-01-30 ENCOUNTER — OFFICE VISIT (OUTPATIENT)
Dept: PSYCHIATRY | Facility: CLINIC | Age: 43
End: 2023-01-30

## 2023-01-30 ENCOUNTER — TELEPHONE (OUTPATIENT)
Dept: PSYCHIATRY | Facility: CLINIC | Age: 43
End: 2023-01-30

## 2023-01-30 DIAGNOSIS — F41.1 GENERALIZED ANXIETY DISORDER: ICD-10-CM

## 2023-01-30 DIAGNOSIS — F32.1 CURRENT MODERATE EPISODE OF MAJOR DEPRESSIVE DISORDER WITHOUT PRIOR EPISODE (HCC): ICD-10-CM

## 2023-01-30 DIAGNOSIS — F90.2 ATTENTION DEFICIT HYPERACTIVITY DISORDER (ADHD), COMBINED TYPE: Primary | ICD-10-CM

## 2023-01-30 RX ORDER — DEXTROAMPHETAMINE SACCHARATE, AMPHETAMINE ASPARTATE, DEXTROAMPHETAMINE SULFATE AND AMPHETAMINE SULFATE 5; 5; 5; 5 MG/1; MG/1; MG/1; MG/1
TABLET ORAL
Qty: 30 TABLET | Refills: 0 | Status: SHIPPED | OUTPATIENT
Start: 2023-02-14

## 2023-01-30 NOTE — PATIENT INSTRUCTIONS
Please call the office nursing staff for medication issues including refills, problems obtaining medications, side effects, etc at [phone]  Please return for a follow up appointment as discussed  If you are running late or are unable to attend your appointment, please call our [place] office at [phone]  If you are in psychological crisis including not limited to suicidal/homicidal thoughts or thoughts of self-injury, do not hesitate to call/contact your [facility] hotline (see below) or go to the nearest emergency department    [facility] Crisis: [address] Crisis: [phone]  [address] Crisis: [address] Crisis: [address] Crisis: [address] Crisis: [facility] Crisis: [phone]  National Suicide Prevention Hotline: 5-770.666.2848
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
## 2023-02-15 DIAGNOSIS — F90.2 ATTENTION DEFICIT HYPERACTIVITY DISORDER (ADHD), COMBINED TYPE: ICD-10-CM

## 2023-02-15 RX ORDER — DEXTROAMPHETAMINE SACCHARATE, AMPHETAMINE ASPARTATE MONOHYDRATE, DEXTROAMPHETAMINE SULFATE AND AMPHETAMINE SULFATE 3.75; 3.75; 3.75; 3.75 MG/1; MG/1; MG/1; MG/1
15 CAPSULE, EXTENDED RELEASE ORAL EVERY MORNING
Qty: 30 CAPSULE | Refills: 0 | Status: SHIPPED | OUTPATIENT
Start: 2023-02-15 | End: 2023-02-16 | Stop reason: RX

## 2023-02-15 NOTE — TELEPHONE ENCOUNTER
PDMP website reviewed  Madi Preston has been appropriately adherent to controlled psychotropic medications (Adderall XR) without evidence of abuse or misuse  As such, will send 30-day refill to pharmacy of choice and follow up as necessary

## 2023-02-15 NOTE — TELEPHONE ENCOUNTER
Medication Refill Request     Name of Medication Adderall XR  Dose/Frequency 15mg 1 daily  Quantity 30 capsule  Verified pharmacy   [x]  Verified ordering Provider   [x]  Does patient have enough for the next 3 days? Yes [] No [x]  Does patient have a follow-up appointment scheduled?  Yes [x] No []   If so when is appointment: 4/3/23

## 2023-02-16 RX ORDER — DEXTROAMPHETAMINE SACCHARATE, AMPHETAMINE ASPARTATE, DEXTROAMPHETAMINE SULFATE AND AMPHETAMINE SULFATE 3.75; 3.75; 3.75; 3.75 MG/1; MG/1; MG/1; MG/1
15 TABLET ORAL DAILY
Qty: 30 TABLET | Refills: 0 | Status: SHIPPED | OUTPATIENT
Start: 2023-02-16

## 2023-02-16 NOTE — TELEPHONE ENCOUNTER
Patient called and stated that pharmacy was able to fill 20mg Adderall but not 15mg Adderall XR  Pharmacy informed her that no other pharmacies in the area have 15mg XR and have been on back order for two months  Pharmacy stated they DO have 15mg Adderall immediate release  Patient stated provider and patient discussed changing her medication to immediate release in past visits and is inquiring if provider can send script for that to same listed pharmacy  Please review

## 2023-02-16 NOTE — TELEPHONE ENCOUNTER
Called pharmacy and spoke to pharmacy staff who confirmed that Adderall XR 15 mg and other dosages are out of stock and that there they have a waiting list for patients to obtain the XR formulation  They stated that the IR formulations are in stock  Requested for the Adderall XR 15 mg to be discontinued which they confirmed  Called patient and discussed this further  Patient verbalized interest in replacing Adderall XR formulation with IR formulation  Discussed plan to discontinue Adderall XR 15 mg and to start Adderall IR 15 mg daily and patient verbalized interest, understanding, and was in agreement with the plan  No acute lethality concerns  Patient denies current adverse effects to medications  Reports she is also going to start the Adderall IR 20 mg daily in the afternoon after she picks up the prescriptions  Risks/benefits/alternativies to treatment discussed, including potential adverse medication side effects, to which Brett Ching voiced understanding and consented fully to treatment  Patient agrees to call the office if she experiences adverse effects

## 2023-02-17 ENCOUNTER — OFFICE VISIT (OUTPATIENT)
Dept: INTERNAL MEDICINE CLINIC | Facility: CLINIC | Age: 43
End: 2023-02-17

## 2023-02-17 VITALS
WEIGHT: 157.2 LBS | BODY MASS INDEX: 26.19 KG/M2 | OXYGEN SATURATION: 98 % | HEIGHT: 65 IN | HEART RATE: 91 BPM | SYSTOLIC BLOOD PRESSURE: 108 MMHG | DIASTOLIC BLOOD PRESSURE: 70 MMHG

## 2023-02-17 DIAGNOSIS — Z00.00 ANNUAL PHYSICAL EXAM: Primary | ICD-10-CM

## 2023-02-17 DIAGNOSIS — Z13.220 SCREENING, LIPID: ICD-10-CM

## 2023-02-17 DIAGNOSIS — R63.5 WEIGHT GAIN: ICD-10-CM

## 2023-02-17 DIAGNOSIS — Z12.31 BREAST CANCER SCREENING BY MAMMOGRAM: ICD-10-CM

## 2023-02-17 DIAGNOSIS — R53.82 CHRONIC FATIGUE: ICD-10-CM

## 2023-02-17 DIAGNOSIS — L70.0 ACNE VULGARIS: ICD-10-CM

## 2023-02-17 RX ORDER — TRETINOIN 0.5 MG/G
CREAM TOPICAL
Qty: 45 G | Refills: 1 | Status: SHIPPED | OUTPATIENT
Start: 2023-02-17 | End: 2023-02-17

## 2023-02-17 RX ORDER — ERYTHROMYCIN BASE IN ETHANOL 2 %
1 SWAB, MEDICATED TOPICAL DAILY
Qty: 60 EACH | Refills: 11 | Status: SHIPPED | OUTPATIENT
Start: 2023-02-17

## 2023-02-17 NOTE — PROGRESS NOTES
[facility]    NAME: Bonnie Marc  AGE: 40 y o  SEX: female  : 1980     DATE: 2023     Assessment and Plan:     Problem List Items Addressed This Visit    None  Visit Diagnoses     Annual physical exam    -  Primary    Chronic fatigue        Relevant Orders    CBC and differential    Comprehensive metabolic panel    TSH, 3rd generation with Free T4 reflex    Weight gain        Relevant Orders    CBC and differential    Comprehensive metabolic panel    TSH, 3rd generation with Free T4 reflex    Acne vulgaris        Relevant Medications    Erythromycin 2 % PADS    tretinoin (REFISSA) 0 05 % cream    Breast cancer screening by mammogram        Relevant Orders    Mammo screening bilateral w 3d & cad    Screening, lipid        Relevant Orders    Lipid Panel with Direct LDL reflex          Immunizations and preventive care screenings were discussed with patient today  Appropriate education was printed on patient's after visit summary  Counseling:  Exercise: the importance of regular exercise/physical activity was discussed  Recommend exercise 3-5 times per week for at least 30 minutes  F/U with gyn    BMI Counseling: Body mass index is 26 16 kg/m²  The BMI is above normal  Exercise recommendations include exercising 3-5 times per week  Rationale for BMI follow-up plan is due to patient being overweight or obese  Return in about 1 year (around 2024)  Chief Complaint:     Chief Complaint   Patient presents with   • Annual Exam      History of Present Illness:     Adult Annual Physical   Patient here for a comprehensive physical exam  The patient reports problems - acne, weight gain  Diet and Physical Activity  Diet/Nutrition: well balanced diet  Exercise: no formal exercise  Depression Screening  PHQ-2/9 Depression Screening         General Health  Sleep: sleeps well     Hearing: normal - bilateral   Vision: no vision problems  Dental: regular dental visits  /GYN Health  Patient is: premenopausal     Review of Systems:     Review of Systems   Constitutional: Positive for fatigue and unexpected weight change (weight gain)  Negative for chills and fever  Respiratory: Negative for shortness of breath  Cardiovascular: Negative for chest pain and palpitations  Gastrointestinal: Positive for constipation  Negative for abdominal pain  Endocrine:        Hair thinning   Genitourinary: Negative for difficulty urinating and menstrual problem  Musculoskeletal: Negative for arthralgias and myalgias  Neurological: Negative for dizziness and headaches  Psychiatric/Behavioral: Negative for dysphoric mood        Past Medical History:     Past Medical History:   Diagnosis Date   • Abnormal Pap smear of cervix    • ADHD    • Anxiety     no meds   • Herpes     denies   • HPV (human papilloma virus) infection    • Rh incompatibility    • Urinary tract infection    • Varicella       Past Surgical History:     Past Surgical History:   Procedure Laterality Date   •  SECTION     • COLPOSCOPY        Social History:     Social History     Socioeconomic History   • Marital status: Single     Spouse name: Mimi Ortega   • Number of children: None   • Years of education: None   • Highest education level: Associate degree: academic program   Occupational History   • Occupation: [employer]   Tobacco Use   • Smoking status: Former     Packs/day: 0 25     Years: 2 00     Pack years: 0 50     Types: Cigarettes     Quit date:      Years since quittin 1   • Smokeless tobacco: Never   Vaping Use   • Vaping Use: Never used   Substance and Sexual Activity   • Alcohol use: Not Currently     Comment: socially prior to confirmed pregnancy   • Drug use: Never   • Sexual activity: Yes     Partners: Male     Birth control/protection: None   Other Topics Concern   • None   Social History Narrative   • None Social Determinants of Health     Financial Resource Strain: Not on file   Food Insecurity: Not on file   Transportation Needs: Not on file   Physical Activity: Not on file   Stress: Not on file   Social Connections: Not on file   Intimate Partner Violence: Not on file   Housing Stability: Not on file      Family History:     Family History   Problem Relation Age of Onset   • Hypertension Mother    • Asthma Mother    • Allergy (severe) Mother    • No Known Problems Half-Brother    • No Known Problems Daughter    • Breast cancer Maternal Grandmother    • Cancer Maternal Grandfather         esophageal, lung   • Heart disease Maternal Grandfather    • No Known Problems Daughter       Current Medications:     Current Outpatient Medications   Medication Sig Dispense Refill   • amphetamine-dextroamphetamine (ADDERALL, 15MG,) 15 MG tablet Take 1 tablet (15 mg total) by mouth daily Max Daily Amount: 15 mg 30 tablet 0   • amphetamine-dextroamphetamine (ADDERALL, 20MG,) 20 mg tablet Take 1 tablet (20 mg) daily in the afternoon Do not start before February 14, 2023  30 tablet 0   • Erythromycin 2 % PADS Apply 1 application topically daily 60 each 11   • escitalopram (LEXAPRO) 10 mg tablet Take 1 tablet (10 mg total) by mouth daily 90 tablet 0   • tretinoin (REFISSA) 0 05 % cream Apply topically daily at bedtime 45 g 1   • levocetirizine (XYZAL) 5 MG tablet Take 5 mg by mouth every evening (Patient not taking: Reported on 2/17/2023)       No current facility-administered medications for this visit  Allergies: Allergies   Allergen Reactions   • Other Allergic Rhinitis     SEASONAL ALLERGIES      Physical Exam:     /70 (BP Location: Left arm, Patient Position: Sitting, Cuff Size: Standard)   Pulse 91   Ht 5' 5" (1 651 m)   Wt 71 3 kg (157 lb 3 2 oz)   SpO2 98%   BMI 26 16 kg/m²     Physical Exam  Constitutional:       General: She is not in acute distress  Appearance: She is well-developed   She is not ill-appearing, toxic-appearing or diaphoretic  HENT:      Head: Normocephalic and atraumatic  Right Ear: External ear normal  There is no impacted cerumen  Left Ear: External ear normal  There is no impacted cerumen  Eyes:      Conjunctiva/sclera: Conjunctivae normal    Cardiovascular:      Rate and Rhythm: Normal rate and regular rhythm  Heart sounds: Normal heart sounds  No murmur heard  Pulmonary:      Effort: Pulmonary effort is normal  No respiratory distress  Breath sounds: Normal breath sounds  No stridor  No wheezing or rales  Abdominal:      General: There is no distension  Palpations: Abdomen is soft  There is no mass  Tenderness: There is no abdominal tenderness  There is no guarding or rebound  Musculoskeletal:      Cervical back: Neck supple  Right lower leg: No edema  Left lower leg: No edema  Neurological:      Mental Status: She is alert and oriented to person, place, and time  Psychiatric:         Mood and Affect: Mood normal          Behavior: Behavior normal          Thought Content:  Thought content normal          Judgment: Judgment normal           Victorino Camarena MD  [facility]

## 2023-03-13 DIAGNOSIS — F90.2 ATTENTION DEFICIT HYPERACTIVITY DISORDER (ADHD), COMBINED TYPE: ICD-10-CM

## 2023-03-13 DIAGNOSIS — F32.1 CURRENT MODERATE EPISODE OF MAJOR DEPRESSIVE DISORDER WITHOUT PRIOR EPISODE (HCC): ICD-10-CM

## 2023-03-13 DIAGNOSIS — F41.1 GENERALIZED ANXIETY DISORDER: ICD-10-CM

## 2023-03-13 RX ORDER — DEXTROAMPHETAMINE SACCHARATE, AMPHETAMINE ASPARTATE, DEXTROAMPHETAMINE SULFATE AND AMPHETAMINE SULFATE 3.75; 3.75; 3.75; 3.75 MG/1; MG/1; MG/1; MG/1
15 TABLET ORAL DAILY
Qty: 30 TABLET | Refills: 0 | Status: SHIPPED | OUTPATIENT
Start: 2023-03-13

## 2023-03-13 RX ORDER — DEXTROAMPHETAMINE SACCHARATE, AMPHETAMINE ASPARTATE, DEXTROAMPHETAMINE SULFATE AND AMPHETAMINE SULFATE 5; 5; 5; 5 MG/1; MG/1; MG/1; MG/1
TABLET ORAL
Qty: 30 TABLET | Refills: 0 | Status: SHIPPED | OUTPATIENT
Start: 2023-03-13

## 2023-03-13 RX ORDER — ESCITALOPRAM OXALATE 10 MG/1
10 TABLET ORAL DAILY
Qty: 90 TABLET | Refills: 0 | Status: SHIPPED | OUTPATIENT
Start: 2023-03-13

## 2023-03-13 NOTE — TELEPHONE ENCOUNTER
Medication Refill Request     Name of Medication   Adderall   Dose/Frequency  15 mg 1 tablet  Quantity  30  Verified pharmacy   [x]  Verified ordering Provider   [x]  Does patient have enough for the next 3 days? Yes [x] No []  Does patient have a follow-up appointment scheduled? Yes [x] No []   If so when is appointment: 4/03    Medication Refill Request     Name of Medication   Adderall  Dose/Frequency  20 mg 1 tablet  Quantity  30  Verified pharmacy   [x]  Verified ordering Provider   [x]  Does patient have enough for the next 3 days? Yes [x] No []  Does patient have a follow-up appointment scheduled? Yes [x] No []   If so when is appointment:  4/03    Medication Refill Request     Name of Medication Lexapro  Dose/Frequency  10 mg 1 tablet  Quantity  90  Verified pharmacy   [x]  Verified ordering Provider   [x]  Does patient have enough for the next 3 days? Yes [x] No []  Does patient have a follow-up appointment scheduled?  Yes [x] No []   If so when is appointment:  4/03

## 2023-03-13 NOTE — TELEPHONE ENCOUNTER
PDMP website reviewed  Keila Chandler has been appropriately adherent to controlled psychotropic medications (Adderall) without evidence of abuse or misuse  As such, will send 30-day refill to pharmacy of choice and follow up as necessary

## 2023-04-03 ENCOUNTER — OFFICE VISIT (OUTPATIENT)
Dept: PSYCHIATRY | Facility: CLINIC | Age: 43
End: 2023-04-03

## 2023-04-03 DIAGNOSIS — F90.2 ATTENTION DEFICIT HYPERACTIVITY DISORDER (ADHD), COMBINED TYPE: ICD-10-CM

## 2023-04-03 DIAGNOSIS — F32.4 MAJOR DEPRESSIVE DISORDER WITH SINGLE EPISODE, IN PARTIAL REMISSION (HCC): Primary | ICD-10-CM

## 2023-04-03 NOTE — BH TREATMENT PLAN
TREATMENT PLAN (Medication Management Only)        [facility]    Name/Date of Birth/MRN:  Tez Avery 40 y o  1980 MRN: [id number]  Date of Treatment Plan: April 3, 2023  Diagnosis/Diagnoses:   1  Major depressive disorder with single episode, in partial remission ([facility] [address] )    2  Attention deficit hyperactivity disorder (ADHD), combined type      Strengths/Personal Resources for Self-Care: Medication and treatment compliance, motivation for treatment, sense of importance of health and wellness, stable job, in [address] of need (in own words): ADHD, depression, anxiety  1  Long Term Goal: Continue to improve ADHD, depression, and anxiety   Target Date: 180 days from treatment plan  Person/Persons responsible for completion of goal: Dr Analia Ruvalcaba and Self  2  Short Term Objective (s) - How will we reach this goal?:   A  Provider new recommended medication/dosage changes and/or continue medication(s): Continue Lexapro, Continue Adderall  B  Individual psychotherapy pending  C  Attend medication management follow-up appointments  D  Patient's care to be transferred to incoming resident in July  E  Exercising  Target Date: 6 months from treatment plan unless noted otherwise  Person/Persons Responsible for Completion of Goal: Dr Analia Ruvalcaba and Self   Progress Towards Goals: continuing treatment, progressing   Treatment Modality: Medication management and therapy PRN  Review due 180 days from date of this plan: Approximately 6 months from today ( 10/3/2023 )    Expected length of service: ongoing treatment unless revised  My Physician/PA/NP and I have developed this plan together and I agree to work on the goals and objectives  I understand the treatment goals that were developed for my treatment    Signature:       Date and time:  Signature of parent/guardian if under age of 15 years: Date and time:  Signature of provider:      Date and time:  Signature of Supervising Physician:    Date and time: 4/3/2023      Parker Lion,      Treatment Plan done but not signed at time of office visit due to:  Plan reviewed by phone or in person  and verbal consent given due to [place] social distancing
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
## 2023-04-03 NOTE — PATIENT INSTRUCTIONS
Please call the office nursing staff for medication issues including refills, problems obtaining medications, side effects, etc at [phone]  Please return for a follow up appointment as discussed  If you are running late or are unable to attend your appointment, please call our [place] office at [phone]  If you are in psychological crisis including not limited to suicidal/homicidal thoughts or thoughts of self-injury, do not hesitate to call/contact your [facility] hotline (see below) or go to the nearest emergency department    [facility] Crisis: [address] Crisis: [phone]  [address] Crisis: [address] Crisis: [address] Crisis: [address] Crisis: [address]  Crisis: [phone]  National Suicide Prevention Hotline: 5-512.441.2483

## 2023-05-11 DIAGNOSIS — F90.2 ATTENTION DEFICIT HYPERACTIVITY DISORDER (ADHD), COMBINED TYPE: ICD-10-CM

## 2023-05-11 RX ORDER — DEXTROAMPHETAMINE SACCHARATE, AMPHETAMINE ASPARTATE, DEXTROAMPHETAMINE SULFATE AND AMPHETAMINE SULFATE 5; 5; 5; 5 MG/1; MG/1; MG/1; MG/1
TABLET ORAL
Qty: 30 TABLET | Refills: 0 | Status: CANCELLED | OUTPATIENT
Start: 2023-05-11

## 2023-05-11 RX ORDER — DEXTROAMPHETAMINE SACCHARATE, AMPHETAMINE ASPARTATE, DEXTROAMPHETAMINE SULFATE AND AMPHETAMINE SULFATE 3.75; 3.75; 3.75; 3.75 MG/1; MG/1; MG/1; MG/1
TABLET ORAL
Qty: 30 TABLET | Refills: 0 | Status: SHIPPED | OUTPATIENT
Start: 2023-05-11

## 2023-05-11 RX ORDER — DEXTROAMPHETAMINE SACCHARATE, AMPHETAMINE ASPARTATE, DEXTROAMPHETAMINE SULFATE AND AMPHETAMINE SULFATE 3.75; 3.75; 3.75; 3.75 MG/1; MG/1; MG/1; MG/1
TABLET ORAL
Qty: 30 TABLET | Refills: 0 | Status: CANCELLED | OUTPATIENT
Start: 2023-05-11

## 2023-05-11 RX ORDER — DEXTROAMPHETAMINE SACCHARATE, AMPHETAMINE ASPARTATE, DEXTROAMPHETAMINE SULFATE AND AMPHETAMINE SULFATE 5; 5; 5; 5 MG/1; MG/1; MG/1; MG/1
TABLET ORAL
Qty: 30 TABLET | Refills: 0 | Status: SHIPPED | OUTPATIENT
Start: 2023-05-11

## 2023-05-11 NOTE — TELEPHONE ENCOUNTER
Medication Refill Request     Name of Medication Adderall  Dose/Frequency 20mg 1 daily  Quantity 30 tablets  Verified pharmacy   [x]  Verified ordering Provider   [x]  Does patient have enough for the next 3 days? Yes [x] No []  Does patient have a follow-up appointment scheduled? Yes [x] No []   If so when is appointment: 5/23    Medication Refill Request     Name of Medication Adderall  Dose/Frequency 15mg 1 daily  Quantity 30tablets  Verified pharmacy   [x]  Verified ordering Provider   [x]  Does patient have enough for the next 3 days? Yes [x] No []  Does patient have a follow-up appointment scheduled?  Yes [x] No []   If so when is appointment: 5/23

## 2023-05-23 ENCOUNTER — OFFICE VISIT (OUTPATIENT)
Dept: PSYCHIATRY | Facility: CLINIC | Age: 43
End: 2023-05-23

## 2023-05-23 DIAGNOSIS — F41.1 GENERALIZED ANXIETY DISORDER: ICD-10-CM

## 2023-05-23 DIAGNOSIS — F90.2 ATTENTION DEFICIT HYPERACTIVITY DISORDER (ADHD), COMBINED TYPE: Primary | ICD-10-CM

## 2023-05-23 DIAGNOSIS — F32.4 MAJOR DEPRESSIVE DISORDER WITH SINGLE EPISODE, IN PARTIAL REMISSION (HCC): ICD-10-CM

## 2023-05-23 RX ORDER — ESCITALOPRAM OXALATE 10 MG/1
10 TABLET ORAL DAILY
Qty: 90 TABLET | Refills: 0 | Status: SHIPPED | OUTPATIENT
Start: 2023-05-23

## 2023-05-23 RX ORDER — DEXTROAMPHETAMINE SACCHARATE, AMPHETAMINE ASPARTATE, DEXTROAMPHETAMINE SULFATE AND AMPHETAMINE SULFATE 5; 5; 5; 5 MG/1; MG/1; MG/1; MG/1
20 TABLET ORAL
Qty: 60 TABLET | Refills: 0 | Status: SHIPPED | OUTPATIENT
Start: 2023-05-25

## 2023-05-23 NOTE — PATIENT INSTRUCTIONS
Please call the office nursing staff for medication issues including refills, problems obtaining medications, side effects, etc at [phone]  Please return for a follow up appointment as discussed  If you are running late or are unable to attend your appointment, please call our [place] office at [phone]  If you are in psychological crisis including not limited to suicidal/homicidal thoughts or thoughts of self-injury, do not hesitate to call/contact your [facility] hotline (see below) or go to the nearest emergency department    [place] Crisis: [address] [facility] Crisis: [phone]  [address] Crisis: [address] Crisis: [address] Crisis: [address] Crisis: [address]  Crisis: [phone]  National Suicide Prevention Hotline: 9-164.111.4363
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
## 2023-05-26 ENCOUNTER — TELEPHONE (OUTPATIENT)
Dept: PSYCHIATRY | Facility: CLINIC | Age: 43
End: 2023-05-26

## 2023-06-21 DIAGNOSIS — F90.2 ATTENTION DEFICIT HYPERACTIVITY DISORDER (ADHD), COMBINED TYPE: ICD-10-CM

## 2023-06-21 NOTE — TELEPHONE ENCOUNTER
Medication Refill Request     Name of Medication ADDERALL   Dose/Frequency 20 mg/ take 1 tablet by mouth 2 times a day  Quantity 60  Verified pharmacy   [x]  Verified ordering Provider   [x]  Does patient have enough for the next 3 days? Yes [] No [x]  Does patient have a follow-up appointment scheduled?  Yes [x] No []   If so when is appointment: 7/25/2023

## 2023-06-22 RX ORDER — DEXTROAMPHETAMINE SACCHARATE, AMPHETAMINE ASPARTATE, DEXTROAMPHETAMINE SULFATE AND AMPHETAMINE SULFATE 5; 5; 5; 5 MG/1; MG/1; MG/1; MG/1
20 TABLET ORAL
Qty: 60 TABLET | Refills: 0 | Status: SHIPPED | OUTPATIENT
Start: 2023-06-22

## 2023-06-22 NOTE — TELEPHONE ENCOUNTER
PDMP website reviewed  Luis Chau has been appropriately adherent to controlled psychotropic medications (Adderall) without evidence of abuse or misuse   As such, will send 30-day refill to pharmacy of choice and patient to follow up with Dr Tran Esteban for transfer of care appointment on 7/25/23

## 2023-07-20 DIAGNOSIS — F90.2 ATTENTION DEFICIT HYPERACTIVITY DISORDER (ADHD), COMBINED TYPE: ICD-10-CM

## 2023-07-20 RX ORDER — DEXTROAMPHETAMINE SACCHARATE, AMPHETAMINE ASPARTATE, DEXTROAMPHETAMINE SULFATE AND AMPHETAMINE SULFATE 5; 5; 5; 5 MG/1; MG/1; MG/1; MG/1
20 TABLET ORAL
Qty: 60 TABLET | Refills: 0 | Status: SHIPPED | OUTPATIENT
Start: 2023-07-20

## 2023-07-20 NOTE — TELEPHONE ENCOUNTER
Medication Refill Request     Name of Medication Adderall  Dose/Frequency 20mg take 1 tablet by mouth 2 times a day  Quantity 60  Verified pharmacy   [x]  Verified ordering Provider   [x]  Does patient have enough for the next 3 days? Yes [x] No []  Does patient have a follow-up appointment scheduled?  Yes [x] No []   If so when is appointment: 7/25/2023 9am

## 2023-08-09 ENCOUNTER — OFFICE VISIT (OUTPATIENT)
Dept: PSYCHIATRY | Facility: CLINIC | Age: 43
End: 2023-08-09

## 2023-08-09 DIAGNOSIS — F32.4 MAJOR DEPRESSIVE DISORDER WITH SINGLE EPISODE, IN PARTIAL REMISSION (HCC): ICD-10-CM

## 2023-08-09 DIAGNOSIS — F41.1 GENERALIZED ANXIETY DISORDER: ICD-10-CM

## 2023-08-09 DIAGNOSIS — F90.2 ATTENTION DEFICIT HYPERACTIVITY DISORDER (ADHD), COMBINED TYPE: Primary | ICD-10-CM

## 2023-08-09 PROCEDURE — 99213 OFFICE O/P EST LOW 20 MIN: CPT | Performed by: PSYCHIATRY & NEUROLOGY
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
## 2023-08-09 NOTE — PATIENT INSTRUCTIONS
Please call the office nursing staff for medication issues including refills, problems getting medications, bothersome side effects, etc., at [phone]. Please return for a follow up appointment as discussed and arrive approximately 15 minutes prior to your appointment time. If you are running late or are unable to attend your appointment, please call our [facility] office at [phone] (fax: [phone]), or if you were seen in the [address] [place] [address] office, please call [phone] (fax [phone]). Look up "grounding techniques" and/or "anchoring demonstration" online and try a few to see what may work for you. Practice these skills before you need them, when you are not feeling too anxious or triggered. You can also search for free guided meditation videos online to help improve your head space when you are feeling very anxious or triggered. Recommendations regarding insomnia:  Wake-up at the same time every day  Refrain from "napping". Refrain from going to bed unless you're tired  Utilize your bedroom for sleep only. Avoid use of electronics including television and/or cellphone/computers. Refrain from use of electronics including television and/or cellphones/computers prior to bed  Turn your alarm clock away so the light is not visible. Attempt relaxation using various means like reading if you're restless in bed for approximately 15-20 minutes. Participate in regular physical activities like exercise, although avoid approximately 3-4 hours prior to bed. Morning exercise is ideal.  Avoid caffeine use prior to bedtime. Consider tapering down excessive use of caffeine. Avoid tobacco use prior to bedtime. Avoid alcohol use prior to bedtime. Consider reading "No More Sleepless nights" by Mesha Rangel, Ph.D.  Consider use of online resources including:  http://[employer]/cbt-online-insomnia-treatment.html  [employer]. com  CBT-I  Nneka on your Smart Phone. Go!  To Sleep by the [facility]. Healthy Diet   The American Heart Association and the [employer] of Cardiology have long recommended a healthy diet for not only patients who are at risk for atherosclerotic cardiovascular disease (ASCVD) but also the general public. In keeping with this evidence-based recommendation, the "2018 Guideline on the Management of Blood Cholesterol" stresses that a healthy diet should include adequate intake of these essentials:   Vegetables, fruits, and whole grains   Legumes and nuts   Low-fat dairy products   Low-fat poultry (without the skin)   Fish and seafood   Nontropical vegetable oils     The recent guidelines do provide room for cultural food preferences in a healthy diet, but in general, all patients should limit their intake of saturated and avoid all trans fats, sweets, sugar-sweetened beverages, and red meats. Please maintain adequate hydration of at least 2 Liters of water per day, and improve nutrition by decreasing portion sizes, avoiding fried foods, fast foods, inappropriate snacking and overly processed and packaged items with 'added sugars' (whether in drinks or foods), and observing nutritional facts information on any items that are packaged to reduce intake of saturated fats and AVOID trans fats as mentioned above. Again, consume whole foods such as vegetables, higher lean protein intake, and using healthier lifestyle plans such as the Mediterranean diet with healthier fats such as those from seeds, nuts, and using organic extra virgin olive oil or avocado oil in lieu of processed vegetable oils or margarine. Physical Activity   Recommended DAILY exercise for at least 150 minutes of moderate exercise weekly! In addition to a healthy diet, all patients should include regular physical activity in their weekly routines, at moderate to vigorous intensity.  Any activity is better than nothing, so if your patients can't meet the recommendation of vigorous activity, moderate-intensity activity can still help them reduce their risk of ASCVD. Below are the American Heart Association's recommendations for physical activity per week (preferably spread throughout the week): For Overall Cardiovascular Health and Lowering Cholesterol   At least 150 minutes of moderate-intensity physical activity (for example, 30 minutes, 5 days a week), or   At least 75 minutes of vigorous-intensity physical activity (for example, 25 minutes, 3 days a week); or   A combination of moderate- and vigorous-intensity aerobic activity, and   At least 2 days of moderate- to high-intensity muscle-strengthening activities (such as resistance weight training) for additional health benefits    Weight Control   It's important to work with patients to help them reach and maintain a healthy weight (Table 3). You may need to suggest that they adjust their caloric intake to avoid weight gain or, in overweight and obese patients, to promote weight loss. Table 3. Body Mass Index           If you have thoughts of harming yourself or are otherwise in psychological crisis, do not hesitate to contact your [facility] hotline, or 911 or go to the nearest emergency room. Adult Crisis Numbers  Suicide Prevention Hotline - Dial   formerly [facility]: [phone] or [address]: [address] [facility] 98: [address]: [phone]  [facility]: [phone] or [phone]  [facility]: [address]: [phone] or [facility]ILDREN[facility] Crisis: [address]: [phone] (daytime). [phone] (after hours, weekends, holidays)     Child/Adolescent Crisis Numbers   [facility]: [address]: [address] [place], [place]: [phone]   [facility]: [phone]  Please note: Some [facility] do not have a separate number for Child/Adolescent specific crisis.  If your county is not listed under Child/Adolescent, please call the adult number for your county     National Talk to [facility]   All Ages - [address]: [phone] or call 324.

## 2023-08-17 DIAGNOSIS — F90.2 ATTENTION DEFICIT HYPERACTIVITY DISORDER (ADHD), COMBINED TYPE: ICD-10-CM

## 2023-08-17 RX ORDER — DEXTROAMPHETAMINE SACCHARATE, AMPHETAMINE ASPARTATE, DEXTROAMPHETAMINE SULFATE AND AMPHETAMINE SULFATE 5; 5; 5; 5 MG/1; MG/1; MG/1; MG/1
20 TABLET ORAL
Qty: 60 TABLET | Refills: 0
Start: 2023-08-17 | End: 2023-08-18 | Stop reason: SDUPTHER

## 2023-08-17 RX ORDER — DEXTROAMPHETAMINE SACCHARATE, AMPHETAMINE ASPARTATE, DEXTROAMPHETAMINE SULFATE AND AMPHETAMINE SULFATE 5; 5; 5; 5 MG/1; MG/1; MG/1; MG/1
20 TABLET ORAL
Qty: 60 TABLET | Refills: 0 | Status: CANCELLED | OUTPATIENT
Start: 2023-08-17

## 2023-08-17 NOTE — TELEPHONE ENCOUNTER
Medication Refill Request     Name of Medication ADDERALL  Dose/Frequency 20 mg/ Take 1 tablet by mouth 2 times a day. Quantity 60  Verified pharmacy   [x]  Verified ordering Provider   [x]  Does patient have enough for the next 3 days? Yes [] No [x]  Does patient have a follow-up appointment scheduled?  Yes [x] No []   If so when is appointment: 11/7/2023

## 2023-08-17 NOTE — TELEPHONE ENCOUNTER
Notified patient requesting the following refill:  Requested Prescriptions     Pending Prescriptions Disp Refills   • amphetamine-dextroamphetamine (ADDERALL, 20MG,) 20 mg tablet 60 tablet 0     Sig: Take 1 tablet (20 mg total) by mouth 2 (two) times a day Max Daily Amount: 40 mg       Patient has been filling medication appropriately per PDMP review. Refill request was sent to Dr. Donovan Navarrete, my indirect supervisor, who will review and decide to approve/send to pharmacy.

## 2023-08-18 RX ORDER — DEXTROAMPHETAMINE SACCHARATE, AMPHETAMINE ASPARTATE, DEXTROAMPHETAMINE SULFATE AND AMPHETAMINE SULFATE 5; 5; 5; 5 MG/1; MG/1; MG/1; MG/1
20 TABLET ORAL
Qty: 60 TABLET | Refills: 0 | Status: SHIPPED | OUTPATIENT
Start: 2023-08-18

## 2023-08-22 ENCOUNTER — APPOINTMENT (OUTPATIENT)
Dept: LAB | Facility: MEDICAL CENTER | Age: 43
End: 2023-08-22
Payer: COMMERCIAL

## 2023-08-22 DIAGNOSIS — Z11.1 SCREENING FOR TUBERCULOSIS: Primary | ICD-10-CM

## 2023-08-22 DIAGNOSIS — Z11.1 SCREENING FOR TUBERCULOSIS: ICD-10-CM

## 2023-08-22 PROCEDURE — 86480 TB TEST CELL IMMUN MEASURE: CPT

## 2023-08-22 PROCEDURE — 36415 COLL VENOUS BLD VENIPUNCTURE: CPT

## 2023-08-24 LAB
GAMMA INTERFERON BACKGROUND BLD IA-ACNC: 0.02 IU/ML
M TB IFN-G BLD-IMP: NEGATIVE
M TB IFN-G CD4+ BCKGRND COR BLD-ACNC: 0 IU/ML
M TB IFN-G CD4+ BCKGRND COR BLD-ACNC: 0 IU/ML
MITOGEN IGNF BCKGRD COR BLD-ACNC: >10 IU/ML

## 2023-09-18 DIAGNOSIS — F90.2 ATTENTION DEFICIT HYPERACTIVITY DISORDER (ADHD), COMBINED TYPE: ICD-10-CM

## 2023-09-18 RX ORDER — DEXTROAMPHETAMINE SACCHARATE, AMPHETAMINE ASPARTATE, DEXTROAMPHETAMINE SULFATE AND AMPHETAMINE SULFATE 5; 5; 5; 5 MG/1; MG/1; MG/1; MG/1
20 TABLET ORAL
Qty: 60 TABLET | Refills: 0
Start: 2023-09-18 | End: 2023-09-18 | Stop reason: SDUPTHER

## 2023-09-18 RX ORDER — DEXTROAMPHETAMINE SACCHARATE, AMPHETAMINE ASPARTATE, DEXTROAMPHETAMINE SULFATE AND AMPHETAMINE SULFATE 5; 5; 5; 5 MG/1; MG/1; MG/1; MG/1
20 TABLET ORAL
Qty: 60 TABLET | Refills: 0 | Status: CANCELLED | OUTPATIENT
Start: 2023-09-18

## 2023-09-18 RX ORDER — DEXTROAMPHETAMINE SACCHARATE, AMPHETAMINE ASPARTATE, DEXTROAMPHETAMINE SULFATE AND AMPHETAMINE SULFATE 5; 5; 5; 5 MG/1; MG/1; MG/1; MG/1
20 TABLET ORAL
Qty: 60 TABLET | Refills: 0 | Status: SHIPPED | OUTPATIENT
Start: 2023-09-18 | End: 2023-10-18

## 2023-09-18 NOTE — TELEPHONE ENCOUNTER
Medication Refill Request     Name of Medication Adderall  Dose/Frequency 20mg two times a day   Quantity 60 tablet  Verified pharmacy   [x]  Verified ordering Provider   [x]  Does patient have enough for the next 3 days? Yes [] No [x]  Does patient have a follow-up appointment scheduled?  Yes [x] No []   If so when is appointment: 11/7/23

## 2023-10-13 ENCOUNTER — TELEPHONE (OUTPATIENT)
Dept: PSYCHIATRY | Facility: CLINIC | Age: 43
End: 2023-10-13

## 2023-10-13 DIAGNOSIS — F90.2 ATTENTION DEFICIT HYPERACTIVITY DISORDER (ADHD), COMBINED TYPE: ICD-10-CM

## 2023-10-13 RX ORDER — DEXTROAMPHETAMINE SACCHARATE, AMPHETAMINE ASPARTATE, DEXTROAMPHETAMINE SULFATE AND AMPHETAMINE SULFATE 5; 5; 5; 5 MG/1; MG/1; MG/1; MG/1
20 TABLET ORAL
Qty: 60 TABLET | Refills: 0 | Status: CANCELLED | OUTPATIENT
Start: 2023-10-13 | End: 2023-11-12

## 2023-10-16 DIAGNOSIS — F90.2 ATTENTION DEFICIT HYPERACTIVITY DISORDER (ADHD), COMBINED TYPE: ICD-10-CM

## 2023-10-16 RX ORDER — DEXTROAMPHETAMINE SACCHARATE, AMPHETAMINE ASPARTATE, DEXTROAMPHETAMINE SULFATE AND AMPHETAMINE SULFATE 5; 5; 5; 5 MG/1; MG/1; MG/1; MG/1
20 TABLET ORAL
Qty: 60 TABLET | Refills: 0 | Status: SHIPPED | OUTPATIENT
Start: 2023-10-16 | End: 2023-11-15

## 2023-10-16 NOTE — TELEPHONE ENCOUNTER
Medication Refill Request     Name of Medication ADDERALL   Dose/Frequency 20 mg/ Take 1 tablet by mouth 2 times a day. Quantity 60  Verified pharmacy   [x]  Verified ordering Provider   [x]  Does patient have enough for the next 3 days? Yes [] No [x]  Does patient have a follow-up appointment scheduled? Yes [x] No []   If so when is appointment: 11/7/2023      Pt stated she only needs a 30 day supply not 60, she stated the pharmacist has to keep over riding it.

## 2023-10-23 ENCOUNTER — TELEPHONE (OUTPATIENT)
Dept: PSYCHIATRY | Facility: CLINIC | Age: 43
End: 2023-10-23

## 2023-10-23 NOTE — TELEPHONE ENCOUNTER
Writer called the pt and left a voicemail for her to call back to let us know if she was able to  her medication last week or not.

## 2023-11-07 ENCOUNTER — OFFICE VISIT (OUTPATIENT)
Dept: PSYCHIATRY | Facility: CLINIC | Age: 43
End: 2023-11-07

## 2023-11-07 VITALS — BODY MASS INDEX: 23.96 KG/M2 | WEIGHT: 144 LBS

## 2023-11-07 DIAGNOSIS — F90.2 ATTENTION DEFICIT HYPERACTIVITY DISORDER (ADHD), COMBINED TYPE: ICD-10-CM

## 2023-11-07 DIAGNOSIS — F32.4 MAJOR DEPRESSIVE DISORDER WITH SINGLE EPISODE, IN PARTIAL REMISSION (HCC): ICD-10-CM

## 2023-11-07 DIAGNOSIS — F41.1 GENERALIZED ANXIETY DISORDER: ICD-10-CM

## 2023-11-07 PROCEDURE — 99214 OFFICE O/P EST MOD 30 MIN: CPT | Performed by: PSYCHIATRY & NEUROLOGY

## 2023-11-07 RX ORDER — DEXTROAMPHETAMINE SACCHARATE, AMPHETAMINE ASPARTATE, DEXTROAMPHETAMINE SULFATE AND AMPHETAMINE SULFATE 5; 5; 5; 5 MG/1; MG/1; MG/1; MG/1
20 TABLET ORAL
Qty: 60 TABLET | Refills: 0 | Status: SHIPPED | OUTPATIENT
Start: 2023-11-13 | End: 2023-12-13

## 2023-11-07 RX ORDER — DEXTROAMPHETAMINE SACCHARATE, AMPHETAMINE ASPARTATE, DEXTROAMPHETAMINE SULFATE AND AMPHETAMINE SULFATE 5; 5; 5; 5 MG/1; MG/1; MG/1; MG/1
20 TABLET ORAL
Qty: 60 TABLET | Refills: 0
Start: 2023-11-13 | End: 2023-11-07 | Stop reason: SDUPTHER

## 2023-11-07 RX ORDER — ESCITALOPRAM OXALATE 10 MG/1
10 TABLET ORAL DAILY
Qty: 30 TABLET | Refills: 1 | Status: SHIPPED | OUTPATIENT
Start: 2023-11-07 | End: 2024-01-06

## 2023-11-07 NOTE — PATIENT INSTRUCTIONS
Please call the office nursing staff for medication issues including refills, problems getting medications, bothersome side effects, etc., at [phone]. Please return for a follow up appointment as discussed and arrive approximately 15 minutes prior to your appointment time. If you are running late or are unable to attend your appointment, please call our [place] office at [phone] (fax: [phone]). Look up "grounding techniques" and/or "anchoring demonstration" online and try a few to see what may work for you. Practice these skills before you need them, when you are not feeling too anxious or triggered. You can also search for free guided meditation videos online to help improve your head space when you are feeling very anxious or triggered. Recommendations regarding insomnia:  Wake-up at the same time every day  Refrain from "napping". Refrain from going to bed unless you're tired  Utilize your bedroom for sleep only. Avoid use of electronics including television and/or cellphone/computers. Refrain from use of electronics including television and/or cellphones/computers prior to bed  Turn your alarm clock away so the light is not visible. Attempt relaxation using various means like reading if you're restless in bed for approximately 15-20 minutes. Participate in regular physical activities like exercise, although avoid approximately 3-4 hours prior to bed. Morning exercise is ideal.  Avoid caffeine use prior to bedtime. Consider tapering down excessive use of caffeine. Avoid tobacco use prior to bedtime. Avoid alcohol use prior to bedtime. Consider reading "No More Sleepless nights" by Kwame Galeas, Ph.D.  Consider use of online resources including:  http://[employer]/cbt-online-insomnia-treatment.html  [employer]. com  CBT-I  Nneka on your Smart Phone. Go! To Sleep by the [facility].     Healthy Diet   The American Heart Association and the [employer] of Cardiology have long recommended a healthy diet for not only patients who are at risk for atherosclerotic cardiovascular disease (ASCVD) but also the general public. In keeping with this evidence-based recommendation, the "2018 Guideline on the Management of Blood Cholesterol" stresses that a healthy diet should include adequate intake of these essentials:   Vegetables, fruits, and whole grains   Legumes and nuts   Low-fat dairy products   Low-fat poultry (without the skin)   Fish and seafood   Nontropical vegetable oils     The recent guidelines do provide room for cultural food preferences in a healthy diet, but in general, all patients should limit their intake of saturated and avoid all trans fats, sweets, sugar-sweetened beverages, and red meats. Please maintain adequate hydration of at least 2 Liters of water per day, and improve nutrition by decreasing portion sizes, avoiding fried foods, fast foods, inappropriate snacking and overly processed and packaged items with 'added sugars' (whether in drinks or foods), and observing nutritional facts information on any items that are packaged to reduce intake of saturated fats and AVOID trans fats as mentioned above. Again, consume whole foods such as vegetables, higher lean protein intake, and using healthier lifestyle plans such as the Mediterranean diet with healthier fats such as those from seeds, nuts, and using organic extra virgin olive oil or avocado oil in lieu of processed vegetable oils or margarine. Physical Activity   Recommended DAILY exercise for at least 150 minutes of moderate exercise weekly! In addition to a healthy diet, all patients should include regular physical activity in their weekly routines, at moderate to vigorous intensity. Any activity is better than nothing, so if your patients can't meet the recommendation of vigorous activity, moderate-intensity activity can still help them reduce their risk of ASCVD.      Below are the [employer] Heart Association's recommendations for physical activity per week (preferably spread throughout the week): For Overall Cardiovascular Health and Lowering Cholesterol   At least 150 minutes of moderate-intensity physical activity (for example, 30 minutes, 5 days a week), or   At least 75 minutes of vigorous-intensity physical activity (for example, 25 minutes, 3 days a week); or   A combination of moderate- and vigorous-intensity aerobic activity, and   At least 2 days of moderate- to high-intensity muscle-strengthening activities (such as resistance weight training) for additional health benefits    Weight Control   It's important to work with patients to help them reach and maintain a healthy weight (Table 3). You may need to suggest that they adjust their caloric intake to avoid weight gain or, in overweight and obese patients, to promote weight loss. Table 3. Body Mass Index           If you have thoughts of harming yourself or are otherwise in psychological crisis, do not hesitate to contact your [facility] hotline, or 911 or go to the nearest emergency room. Adult Crisis Numbers  Suicide Prevention Hotline - Dial 9-8-8  [facility]: [phone] or [address] [place] [address] Avenue: [address] [place] [address] 98: [address]: [phone]  [address] [place] [address] Street: [phone] or [phone]  [facility]: [address]: [phone] or [facility] Crisis: [address] [place] [address] Ne: [phone] (daytime). [phone] (after hours, weekends, holidays)     Child/Adolescent Crisis Numbers   [facility]: [address]: [address] [place], [place]: [phone]   [address] [place] [address]: [phone]  Please note: Some [facility] do not have a separate number for Child/Adolescent specific crisis.  If your county is not listed under Child/Adolescent, please call the adult number for your county     [address] [place] [address] Talk to Text Line   All Ages - [phone]    National Suicide Prevention Hotline: 6-666.656.5799 or call 53 929255.
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
## 2023-12-11 DIAGNOSIS — F90.2 ATTENTION DEFICIT HYPERACTIVITY DISORDER (ADHD), COMBINED TYPE: ICD-10-CM

## 2023-12-11 RX ORDER — DEXTROAMPHETAMINE SACCHARATE, AMPHETAMINE ASPARTATE, DEXTROAMPHETAMINE SULFATE AND AMPHETAMINE SULFATE 5; 5; 5; 5 MG/1; MG/1; MG/1; MG/1
20 TABLET ORAL
Qty: 60 TABLET | Refills: 0 | Status: SHIPPED | OUTPATIENT
Start: 2023-12-11 | End: 2024-01-10

## 2023-12-11 RX ORDER — DEXTROAMPHETAMINE SACCHARATE, AMPHETAMINE ASPARTATE, DEXTROAMPHETAMINE SULFATE AND AMPHETAMINE SULFATE 5; 5; 5; 5 MG/1; MG/1; MG/1; MG/1
20 TABLET ORAL
Qty: 60 TABLET | Refills: 0
Start: 2023-12-11 | End: 2023-12-11 | Stop reason: SDUPTHER

## 2023-12-11 NOTE — TELEPHONE ENCOUNTER
Notified patient requesting the following refill:  Requested Prescriptions     Pending Prescriptions Disp Refills    amphetamine-dextroamphetamine (ADDERALL, 20MG,) 20 mg tablet 60 tablet 0     Sig: Take 1 tablet (20 mg total) by mouth 2 (two) times a day Max Daily Amount: 40 mg       Marry Leal has been filling controlled prescriptions on time as prescribed according to [address] [facility] [address] Program    Refill request was sent to Dr. Pierre Dumas, my indirect supervisor, for cosignature.

## 2024-01-08 DIAGNOSIS — F41.1 GENERALIZED ANXIETY DISORDER: ICD-10-CM

## 2024-01-08 DIAGNOSIS — F32.4 MAJOR DEPRESSIVE DISORDER WITH SINGLE EPISODE, IN PARTIAL REMISSION (HCC): ICD-10-CM

## 2024-01-08 DIAGNOSIS — F90.2 ATTENTION DEFICIT HYPERACTIVITY DISORDER (ADHD), COMBINED TYPE: ICD-10-CM

## 2024-01-09 ENCOUNTER — OFFICE VISIT (OUTPATIENT)
Dept: PSYCHIATRY | Facility: CLINIC | Age: 44
End: 2024-01-09

## 2024-01-09 VITALS — BODY MASS INDEX: 23.96 KG/M2 | WEIGHT: 144 LBS | DIASTOLIC BLOOD PRESSURE: 84 MMHG | SYSTOLIC BLOOD PRESSURE: 125 MMHG

## 2024-01-09 DIAGNOSIS — F90.2 ATTENTION DEFICIT HYPERACTIVITY DISORDER (ADHD), COMBINED TYPE: Primary | ICD-10-CM

## 2024-01-09 DIAGNOSIS — F41.1 GENERALIZED ANXIETY DISORDER: ICD-10-CM

## 2024-01-09 DIAGNOSIS — F32.4 MAJOR DEPRESSIVE DISORDER WITH SINGLE EPISODE, IN PARTIAL REMISSION (HCC): ICD-10-CM

## 2024-01-09 PROCEDURE — 99214 OFFICE O/P EST MOD 30 MIN: CPT

## 2024-01-09 RX ORDER — DEXTROAMPHETAMINE SACCHARATE, AMPHETAMINE ASPARTATE, DEXTROAMPHETAMINE SULFATE AND AMPHETAMINE SULFATE 5; 5; 5; 5 MG/1; MG/1; MG/1; MG/1
20 TABLET ORAL
Status: CANCELLED
Start: 2024-01-09 | End: 2024-02-08

## 2024-01-09 RX ORDER — ESCITALOPRAM OXALATE 10 MG/1
10 TABLET ORAL DAILY
Qty: 30 TABLET | Refills: 2 | Status: SHIPPED | OUTPATIENT
Start: 2024-01-09 | End: 2024-04-08

## 2024-01-09 RX ORDER — ESCITALOPRAM OXALATE 10 MG/1
10 TABLET ORAL DAILY
Qty: 30 TABLET | Refills: 1 | Status: CANCELLED | OUTPATIENT
Start: 2024-01-09 | End: 2024-03-09

## 2024-01-09 RX ORDER — DEXTROAMPHETAMINE SACCHARATE, AMPHETAMINE ASPARTATE, DEXTROAMPHETAMINE SULFATE AND AMPHETAMINE SULFATE 5; 5; 5; 5 MG/1; MG/1; MG/1; MG/1
20 TABLET ORAL
Start: 2024-01-09 | End: 2024-01-09 | Stop reason: SDUPTHER

## 2024-01-09 RX ORDER — DEXTROAMPHETAMINE SACCHARATE, AMPHETAMINE ASPARTATE, DEXTROAMPHETAMINE SULFATE AND AMPHETAMINE SULFATE 5; 5; 5; 5 MG/1; MG/1; MG/1; MG/1
20 TABLET ORAL
Qty: 60 TABLET | Refills: 0 | Status: SHIPPED | OUTPATIENT
Start: 2024-01-09 | End: 2024-02-08
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
## 2024-01-09 NOTE — PATIENT INSTRUCTIONS
"Please call the office nursing staff for medication issues including refills, problems getting medications, bothersome side effects, etc., at [phone].     Please return for a follow up appointment as discussed and arrive approximately 15 minutes prior to your appointment time. If you are running late or are unable to attend your appointment, please call our [place] office at [phone] (fax: [phone]).    Look up \"grounding techniques\" and/or \"anchoring demonstration\" online and try a few to see what may work for you. Practice these skills before you need them, when you are not feeling too anxious or triggered. You can also search for free guided meditation videos online to help improve your head space when you are feeling very anxious or triggered.    Recommendations regarding insomnia:  Wake-up at the same time every day  Refrain from \"napping\".  Refrain from going to bed unless you're tired  Utilize your bedroom for sleep only.  Avoid use of electronics including television and/or cellphone/computers.  Refrain from use of electronics including television and/or cellphones/computers prior to bed  Turn your alarm clock away so the light is not visible.  Attempt relaxation using various means like reading if you're restless in bed for approximately 15-20 minutes.  Participate in regular physical activities like exercise, although avoid approximately 3-4 hours prior to bed.  Morning exercise is ideal.  Avoid caffeine use prior to bedtime.  Consider tapering down excessive use of caffeine.  Avoid tobacco use prior to bedtime.  Avoid alcohol use prior to bedtime.  Consider reading \"No More Sleepless nights\" by Scot Nation, Ph.D.  Consider use of online resources including:  http://[employer]/cbt-online-insomnia-treatment.html  http://www.[employer]  CBT-I  Nneka on your Smart Phone.  Go! To Sleep by the [facility].    Healthy Diet   The American Heart Association and the American College of " "Cardiology have long recommended a healthy diet for not only patients who are at risk for atherosclerotic cardiovascular disease (ASCVD) but also the general public. In keeping with this evidence-based recommendation, the \"2018 Guideline on the Management of Blood Cholesterol\" stresses that a healthy diet should include adequate intake of these essentials:   Vegetables, fruits, and whole grains   Legumes and nuts   Low-fat dairy products   Low-fat poultry (without the skin)   Fish and seafood   Nontropical vegetable oils     The recent guidelines do provide room for cultural food preferences in a healthy diet, but in general, all patients should limit their intake of saturated and avoid all trans fats, sweets, sugar-sweetened beverages, and red meats.  Please maintain adequate hydration of at least 2 Liters of water per day, and improve nutrition by decreasing portion sizes, avoiding fried foods, fast foods, inappropriate snacking and overly processed and packaged items with 'added sugars' (whether in drinks or foods), and observing nutritional facts information on any items that are packaged to reduce intake of saturated fats and AVOID trans fats as mentioned above. Again, consume whole foods such as vegetables, higher lean protein intake, and using healthier lifestyle plans such as the Mediterranean diet with healthier fats such as those from seeds, nuts, and using organic extra virgin olive oil or avocado oil in lieu of processed vegetable oils or margarine.    Physical Activity   Recommended DAILY exercise for at least 150 minutes of moderate exercise weekly!  In addition to a healthy diet, all patients should include regular physical activity in their weekly routines, at moderate to vigorous intensity. Any activity is better than nothing, so if your patients can't meet the recommendation of vigorous activity, moderate-intensity activity can still help them reduce their risk of ASCVD.     Below are the American " Heart Association's recommendations for physical activity per week (preferably spread throughout the week):     For Overall Cardiovascular Health and Lowering Cholesterol   At least 150 minutes of moderate-intensity physical activity (for example, 30 minutes, 5 days a week), or   At least 75 minutes of vigorous-intensity physical activity (for example, 25 minutes, 3 days a week); or   A combination of moderate- and vigorous-intensity aerobic activity, and   At least 2 days of moderate- to high-intensity muscle-strengthening activities (such as resistance weight training) for additional health benefits    Weight Control   It's important to work with patients to help them reach and maintain a healthy weight (Table 3). You may need to suggest that they adjust their caloric intake to avoid weight gain or, in overweight and obese patients, to promote weight loss.     Table 3. Body Mass Index           If you have thoughts of harming yourself or are otherwise in psychological crisis, do not hesitate to contact your [facility] Crisis hotline, or 911 or go to the nearest emergency room.  Adult Crisis Numbers  Suicide Prevention Hotline - Dial 9-8-8  [facility]: [phone] or [phone]  [facility]: [phone]  [facility]: [phone]  [facility]: [phone]  [facility]: [phone] or [phone]  [facility]: [phone]  [facility]: [phone] or [facility] Crisis: [phone]  [facility]: [phone] (daytime).       [phone] (after hours, weekends, holidays)     Child/Adolescent Crisis Numbers   [facility]: [phone]   [facility]: [phone]   [place], [place]: [phone]   [facility]: [phone]  Please note: Some [facility] do not have a separate number for Child/Adolescent specific crisis. If your county is not listed under Child/Adolescent, please call the adult number for your county     National Talk  to Text Line   All Ages - 741-741    National Suicide Prevention Hotline: 1-500.822.3187 or call 387.

## 2024-01-23 DIAGNOSIS — F32.4 MAJOR DEPRESSIVE DISORDER WITH SINGLE EPISODE, IN PARTIAL REMISSION (HCC): ICD-10-CM

## 2024-01-23 DIAGNOSIS — F41.1 GENERALIZED ANXIETY DISORDER: ICD-10-CM

## 2024-01-23 RX ORDER — ESCITALOPRAM OXALATE 10 MG/1
10 TABLET ORAL DAILY
Qty: 90 TABLET | Refills: 1 | Status: SHIPPED | OUTPATIENT
Start: 2024-01-23

## 2024-02-07 DIAGNOSIS — F90.2 ATTENTION DEFICIT HYPERACTIVITY DISORDER (ADHD), COMBINED TYPE: ICD-10-CM

## 2024-02-07 RX ORDER — DEXTROAMPHETAMINE SACCHARATE, AMPHETAMINE ASPARTATE, DEXTROAMPHETAMINE SULFATE AND AMPHETAMINE SULFATE 5; 5; 5; 5 MG/1; MG/1; MG/1; MG/1
20 TABLET ORAL
Start: 2024-02-07 | End: 2024-02-07 | Stop reason: SDUPTHER

## 2024-02-07 RX ORDER — DEXTROAMPHETAMINE SACCHARATE, AMPHETAMINE ASPARTATE, DEXTROAMPHETAMINE SULFATE AND AMPHETAMINE SULFATE 5; 5; 5; 5 MG/1; MG/1; MG/1; MG/1
20 TABLET ORAL
Qty: 60 TABLET | Refills: 0 | Status: SHIPPED | OUTPATIENT
Start: 2024-02-07 | End: 2024-03-08

## 2024-02-07 NOTE — TELEPHONE ENCOUNTER
Notified patient requesting the following refill:  Requested Prescriptions     Pending Prescriptions Disp Refills    amphetamine-dextroamphetamine (ADDERALL, 20MG,) 20 mg tablet 60 tablet 0     Sig: Take 1 tablet (20 mg total) by mouth 2 (two) times a day Max Daily Amount: 40 mg       Margarita has been filling controlled prescriptions on time as prescribed according to [place] Prescription Drug Monitoring Program    Refill request was sent to Dr. Marx, my indirect supervisor, for cosignature.

## 2024-03-06 DIAGNOSIS — F90.2 ATTENTION DEFICIT HYPERACTIVITY DISORDER (ADHD), COMBINED TYPE: ICD-10-CM

## 2024-03-06 RX ORDER — DEXTROAMPHETAMINE SACCHARATE, AMPHETAMINE ASPARTATE, DEXTROAMPHETAMINE SULFATE AND AMPHETAMINE SULFATE 5; 5; 5; 5 MG/1; MG/1; MG/1; MG/1
20 TABLET ORAL
Qty: 60 TABLET | Refills: 0 | Status: SHIPPED | OUTPATIENT
Start: 2024-03-06 | End: 2024-04-05

## 2024-03-06 RX ORDER — DEXTROAMPHETAMINE SACCHARATE, AMPHETAMINE ASPARTATE, DEXTROAMPHETAMINE SULFATE AND AMPHETAMINE SULFATE 5; 5; 5; 5 MG/1; MG/1; MG/1; MG/1
20 TABLET ORAL
Start: 2024-03-06 | End: 2024-03-06 | Stop reason: SDUPTHER

## 2024-04-03 DIAGNOSIS — F90.2 ATTENTION DEFICIT HYPERACTIVITY DISORDER (ADHD), COMBINED TYPE: ICD-10-CM

## 2024-04-03 RX ORDER — DEXTROAMPHETAMINE SACCHARATE, AMPHETAMINE ASPARTATE, DEXTROAMPHETAMINE SULFATE AND AMPHETAMINE SULFATE 5; 5; 5; 5 MG/1; MG/1; MG/1; MG/1
20 TABLET ORAL
Qty: 60 TABLET | Refills: 0
Start: 2024-04-04 | End: 2024-04-04 | Stop reason: SDUPTHER

## 2024-04-03 NOTE — TELEPHONE ENCOUNTER
Currently covering for Dr. Bowman while she is out of the office.    Notified patient requesting the following refill:  Requested Prescriptions     Pending Prescriptions Disp Refills    amphetamine-dextroamphetamine (ADDERALL, 20MG,) 20 mg tablet 60 tablet 0     Sig: Take 1 tablet (20 mg total) by mouth 2 (two) times a day Max Daily Amount: 40 mg Do not start before April 4, 2024.       Margarita has been filling controlled prescriptions on time as prescribed according to [place] Prescription Drug Monitoring Program    Refill request was sent to Dr. Adam Medina III, my indirect supervisor, for cosignature.

## 2024-04-04 DIAGNOSIS — F90.2 ATTENTION DEFICIT HYPERACTIVITY DISORDER (ADHD), COMBINED TYPE: ICD-10-CM

## 2024-04-04 RX ORDER — DEXTROAMPHETAMINE SACCHARATE, AMPHETAMINE ASPARTATE, DEXTROAMPHETAMINE SULFATE AND AMPHETAMINE SULFATE 5; 5; 5; 5 MG/1; MG/1; MG/1; MG/1
20 TABLET ORAL
Qty: 60 TABLET | Refills: 0 | Status: SHIPPED | OUTPATIENT
Start: 2024-04-04 | End: 2024-05-04

## 2024-05-01 DIAGNOSIS — F90.2 ATTENTION DEFICIT HYPERACTIVITY DISORDER (ADHD), COMBINED TYPE: ICD-10-CM

## 2024-05-01 RX ORDER — DEXTROAMPHETAMINE SACCHARATE, AMPHETAMINE ASPARTATE, DEXTROAMPHETAMINE SULFATE AND AMPHETAMINE SULFATE 5; 5; 5; 5 MG/1; MG/1; MG/1; MG/1
20 TABLET ORAL
Start: 2024-05-01 | End: 2024-05-01 | Stop reason: SDUPTHER

## 2024-05-01 RX ORDER — DEXTROAMPHETAMINE SACCHARATE, AMPHETAMINE ASPARTATE, DEXTROAMPHETAMINE SULFATE AND AMPHETAMINE SULFATE 5; 5; 5; 5 MG/1; MG/1; MG/1; MG/1
20 TABLET ORAL
Qty: 60 TABLET | Refills: 0 | Status: SHIPPED | OUTPATIENT
Start: 2024-05-01 | End: 2024-05-31

## 2024-05-29 DIAGNOSIS — F90.2 ATTENTION DEFICIT HYPERACTIVITY DISORDER (ADHD), COMBINED TYPE: ICD-10-CM

## 2024-05-29 RX ORDER — DEXTROAMPHETAMINE SACCHARATE, AMPHETAMINE ASPARTATE, DEXTROAMPHETAMINE SULFATE AND AMPHETAMINE SULFATE 5; 5; 5; 5 MG/1; MG/1; MG/1; MG/1
20 TABLET ORAL
Start: 2024-05-29 | End: 2024-05-29 | Stop reason: SDUPTHER

## 2024-05-29 NOTE — TELEPHONE ENCOUNTER
Notified patient requesting the following refill:  Requested Prescriptions     Pending Prescriptions Disp Refills    amphetamine-dextroamphetamine (ADDERALL, 20MG,) 20 mg tablet       Sig: Take 1 tablet (20 mg total) by mouth 2 (two) times a day Max Daily Amount: 40 mg       Margarita has been filling controlled prescriptions on time as prescribed according to [place] Prescription Drug Monitoring Program    Refill request was sent to Dr. Marx, my indirect supervisor, for cosignature.

## 2024-06-19 ENCOUNTER — TELEMEDICINE (OUTPATIENT)
Dept: PSYCHIATRY | Facility: CLINIC | Age: 44
End: 2024-06-19

## 2024-06-19 DIAGNOSIS — F90.2 ATTENTION DEFICIT HYPERACTIVITY DISORDER (ADHD), COMBINED TYPE: ICD-10-CM

## 2024-06-19 DIAGNOSIS — F41.1 GENERALIZED ANXIETY DISORDER: Primary | ICD-10-CM

## 2024-06-19 DIAGNOSIS — F32.5 MAJOR DEPRESSIVE DISORDER WITH SINGLE EPISODE, IN FULL REMISSION (HCC): ICD-10-CM

## 2024-06-19 PROCEDURE — 99213 OFFICE O/P EST LOW 20 MIN: CPT | Performed by: PSYCHIATRY & NEUROLOGY

## 2024-06-19 RX ORDER — DEXTROAMPHETAMINE SACCHARATE, AMPHETAMINE ASPARTATE, DEXTROAMPHETAMINE SULFATE AND AMPHETAMINE SULFATE 5; 5; 5; 5 MG/1; MG/1; MG/1; MG/1
20 TABLET ORAL
Start: 2024-07-26 | End: 2024-06-19 | Stop reason: SDUPTHER

## 2024-06-19 RX ORDER — DEXTROAMPHETAMINE SACCHARATE, AMPHETAMINE ASPARTATE, DEXTROAMPHETAMINE SULFATE AND AMPHETAMINE SULFATE 5; 5; 5; 5 MG/1; MG/1; MG/1; MG/1
20 TABLET ORAL
Start: 2024-06-26 | End: 2024-06-19 | Stop reason: SDUPTHER

## 2024-06-19 RX ORDER — DEXTROAMPHETAMINE SACCHARATE, AMPHETAMINE ASPARTATE, DEXTROAMPHETAMINE SULFATE AND AMPHETAMINE SULFATE 5; 5; 5; 5 MG/1; MG/1; MG/1; MG/1
20 TABLET ORAL
Qty: 60 TABLET | Refills: 0 | Status: SHIPPED | OUTPATIENT
Start: 2024-06-26 | End: 2024-07-26

## 2024-06-19 RX ORDER — ESCITALOPRAM OXALATE 10 MG/1
10 TABLET ORAL DAILY
Qty: 30 TABLET | Refills: 0 | Status: SHIPPED | OUTPATIENT
Start: 2024-06-19 | End: 2024-06-19 | Stop reason: SDUPTHER

## 2024-06-19 RX ORDER — DEXTROAMPHETAMINE SACCHARATE, AMPHETAMINE ASPARTATE, DEXTROAMPHETAMINE SULFATE AND AMPHETAMINE SULFATE 5; 5; 5; 5 MG/1; MG/1; MG/1; MG/1
20 TABLET ORAL
Qty: 60 TABLET | Refills: 0 | Status: SHIPPED | OUTPATIENT
Start: 2024-07-26

## 2024-06-19 RX ORDER — ESCITALOPRAM OXALATE 10 MG/1
10 TABLET ORAL DAILY
Qty: 30 TABLET | Refills: 2 | Status: SHIPPED | OUTPATIENT
Start: 2024-06-19 | End: 2024-09-17
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
## 2024-06-24 ENCOUNTER — OCCMED (OUTPATIENT)
Dept: URGENT CARE | Facility: CLINIC | Age: 44
End: 2024-06-24

## 2024-06-24 ENCOUNTER — APPOINTMENT (OUTPATIENT)
Dept: LAB | Facility: CLINIC | Age: 44
End: 2024-06-24

## 2024-06-24 DIAGNOSIS — Z02.1 ENCOUNTER FOR PRE-EMPLOYMENT EXAMINATION: ICD-10-CM

## 2024-06-24 DIAGNOSIS — Z02.1 ENCOUNTER FOR PRE-EMPLOYMENT EXAMINATION: Primary | ICD-10-CM

## 2024-06-24 LAB
MEV IGG SER QL IA: NORMAL
MUV IGG SER QL IA: NORMAL
RUBV IGG SERPL IA-ACNC: 100.4 IU/ML
VZV IGG SER QL IA: NORMAL

## 2024-06-24 PROCEDURE — 86735 MUMPS ANTIBODY: CPT

## 2024-06-24 PROCEDURE — 36415 COLL VENOUS BLD VENIPUNCTURE: CPT

## 2024-06-24 PROCEDURE — 86480 TB TEST CELL IMMUN MEASURE: CPT

## 2024-06-24 PROCEDURE — 86765 RUBEOLA ANTIBODY: CPT

## 2024-06-24 PROCEDURE — 86762 RUBELLA ANTIBODY: CPT

## 2024-06-24 PROCEDURE — 86787 VARICELLA-ZOSTER ANTIBODY: CPT

## 2024-06-25 LAB
GAMMA INTERFERON BACKGROUND BLD IA-ACNC: 0.02 IU/ML
M TB IFN-G BLD-IMP: NEGATIVE
M TB IFN-G CD4+ BCKGRND COR BLD-ACNC: 0.02 IU/ML
M TB IFN-G CD4+ BCKGRND COR BLD-ACNC: 0.03 IU/ML
MITOGEN IGNF BCKGRD COR BLD-ACNC: 9.98 IU/ML

## 2024-06-28 ENCOUNTER — TELEPHONE (OUTPATIENT)
Dept: PSYCHIATRY | Facility: CLINIC | Age: 44
End: 2024-06-28

## 2024-06-28 ENCOUNTER — APPOINTMENT (OUTPATIENT)
Dept: URGENT CARE | Facility: CLINIC | Age: 44
End: 2024-06-28

## 2024-08-23 DIAGNOSIS — F90.2 ATTENTION DEFICIT HYPERACTIVITY DISORDER (ADHD), COMBINED TYPE: ICD-10-CM

## 2024-08-23 RX ORDER — DEXTROAMPHETAMINE SACCHARATE, AMPHETAMINE ASPARTATE, DEXTROAMPHETAMINE SULFATE AND AMPHETAMINE SULFATE 5; 5; 5; 5 MG/1; MG/1; MG/1; MG/1
20 TABLET ORAL
Qty: 60 TABLET | Refills: 0 | Status: SHIPPED | OUTPATIENT
Start: 2024-08-23

## 2024-08-23 NOTE — TELEPHONE ENCOUNTER
Medication: adderall  07/26/2024 06/19/2024 Amphetamine Salt Combo (Tablet) 60.0 30 20 MG NA [facility] Private Pay 0 / 0 [place]   1 [id number] 06/26/2024 06/19/2024 Amphetamine Salt Combo (Tablet) 60.0 30 20 MG [facility] [place] 0 / 0 [place]   1 [id number] 05/29/2024 05/29/2024 Amphetamine Salt Combo (Tablet) 60.0 60 20 MG [facility] [place] 0 / 0 [place]   1 [id number] 05/01/2024 05/01/2024 Amphetamine Salt Combo (Tablet) 60.0 30 20 MG NA [facility] Private [place] 0 / 0 [place]   1 [id number] 04/04/2024 04/04/2024 Amphetamine Salt Combo (Tablet) 60.0 30 20 MG NA [facility] Private [place] 0 / 0 [place]   1 [id number] 03/07/2024 03/06/2024 Amphetamine Salt Combo (Tablet) 60.0 30 20 MG [facility] 0 / 0

## 2024-08-23 NOTE — TELEPHONE ENCOUNTER
Reason for call:   [x] Refill   [] Prior Auth  [] Other:     Office:   [] PCP/Provider -   [x] Specialty/Provider - Merlyn Marx MD -[facility]     Medication: amphetamine-dextroamphetamine (ADDERALL, 20MG,) 20 mg tablet     Take 1 tablet (20 mg total) by mouth 2 (two) times a day Max Daily Amount: 40 mg Do not start before July 26, 2024.     Pharmacy: [place] [facility]    Does the patient have enough for 3 days?   [] Yes   [x] No - Send as HP to POD

## 2024-08-29 ENCOUNTER — OFFICE VISIT (OUTPATIENT)
Dept: PSYCHIATRY | Facility: CLINIC | Age: 44
End: 2024-08-29
Payer: COMMERCIAL

## 2024-08-29 DIAGNOSIS — F41.1 GENERALIZED ANXIETY DISORDER: ICD-10-CM

## 2024-08-29 DIAGNOSIS — F90.2 ATTENTION DEFICIT HYPERACTIVITY DISORDER (ADHD), COMBINED TYPE: Primary | ICD-10-CM

## 2024-08-29 DIAGNOSIS — F32.5 MAJOR DEPRESSIVE DISORDER WITH SINGLE EPISODE, IN FULL REMISSION (HCC): ICD-10-CM

## 2024-08-29 PROCEDURE — 90792 PSYCH DIAG EVAL W/MED SRVCS: CPT

## 2024-08-29 RX ORDER — ESCITALOPRAM OXALATE 10 MG/1
10 TABLET ORAL DAILY
Qty: 30 TABLET | Refills: 2 | Status: SHIPPED | OUTPATIENT
Start: 2024-08-29 | End: 2024-11-27
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
## 2024-08-29 NOTE — BH TREATMENT PLAN
TREATMENT PLAN - Medication Management        [facility]    Name and Date of Birth:  Margarita Park 43 y.o. 1980  Date of Treatment Plan: August 29, 2024  Diagnosis/Diagnoses:    1. Generalized anxiety disorder    2. Major depressive disorder with single episode, in full remission (HCC)        Strengths/Personal Resources for Self-Care: supportive family, supportive friends, taking medications as prescribed, ability to adapt to life changes, ability to communicate needs, ability to reason, ability to understand psychiatric illness, average or above intelligence, good physical health, good understanding of illness, motivation for treatment, sense of humor, well educated, work skills    Area/Areas of need: anxiety symptoms    Long Term Goal: continue improvement in acceptable anxiety level  Target Date: 6 months - February 28, 2025  Person/Persons responsible for completion of goal: Aline Wong DO     Short Term Objective (s) - How will we reach this goal?:   Take medications as prescribed  Attend psychiatry appointments regularly  Eat a healthy diet   Take walks regularly  Target Date: 3 months - November 29, 2024  Person/Persons Responsible for Completion of Goal: Margarita     Progress Towards Goals: Continuing treatment    Treatment Modality: medication management every 1-3 months as needed  Review due 180 days from date of this plan: February 25, 2025   Expected length of service: Ongoing treatment    My physician and I have developed this plan together, and I agree to work on the goals and objectives. I understand the treatment goals that were developed for my treatment.    The treatment plan was created between Scot Wong DO and Margarita Park on 08/29/24 at 1:49 PM.

## 2024-09-19 DIAGNOSIS — F90.2 ATTENTION DEFICIT HYPERACTIVITY DISORDER (ADHD), COMBINED TYPE: ICD-10-CM

## 2024-09-19 NOTE — TELEPHONE ENCOUNTER
Medication:  PDMP  08/23/2024 08/23/2024 Amphetamine Salt Combo (Tablet) 60.0 30 20 MG NA [facility] 0 / 0 [place]   1 [id number] 07/26/2024 06/19/2024 Amphetamine Salt Combo (Tablet) 60.0 30 20 MG [facility] Private Pay 0 / 0 PA   1 [id number] 06/26/2024 06/19/2024 Amphetamine Salt Combo (Tablet) 60.0 30 20 MG NA [facility]  Active agreement on file -

## 2024-09-19 NOTE — TELEPHONE ENCOUNTER
Reason for call:   [x] Refill   [] Prior Auth  [] Other:     Office:   [] PCP/Provider -   [x] Specialty/Provider - Psychiatry     Medication: amphetamine-dextroamphetamine (ADDERALL, 20MG,) 20 mg tablet     Dose/Frequency: 20 mg, 2 times daily (morning and afternoon     Quantity: 60    Pharmacy: [id number]    Does the patient have enough for 3 days?   [x] Yes   [] No - Send as HP to POD

## 2024-09-20 RX ORDER — DEXTROAMPHETAMINE SACCHARATE, AMPHETAMINE ASPARTATE, DEXTROAMPHETAMINE SULFATE AND AMPHETAMINE SULFATE 5; 5; 5; 5 MG/1; MG/1; MG/1; MG/1
20 TABLET ORAL
Qty: 60 TABLET | Refills: 0 | Status: SHIPPED | OUTPATIENT
Start: 2024-09-20

## 2024-09-20 NOTE — TELEPHONE ENCOUNTER
Patient is calling to check on the status of her medication she will run out of medication by Sunday

## 2024-10-17 DIAGNOSIS — F90.2 ATTENTION DEFICIT HYPERACTIVITY DISORDER (ADHD), COMBINED TYPE: ICD-10-CM

## 2024-10-17 NOTE — TELEPHONE ENCOUNTER
Medication Refill Request     Name of Medication amphetamine-dextroamphetamine (ADDERALL, 20MG,) 20 mg tablet   Dose/Frequency Take 1 tablet (20 mg total) by mouth 2 (two) times a day Max Daily Amount: 40 mg   Quantity 60  Verified pharmacy   [x]  Verified ordering Provider   [x]  Does patient have enough for the next 3 days? Yes [] No [x]  Does patient have a follow-up appointment scheduled? Yes [x] No []   If so when is appointment: 10/28/24

## 2024-10-18 RX ORDER — DEXTROAMPHETAMINE SACCHARATE, AMPHETAMINE ASPARTATE, DEXTROAMPHETAMINE SULFATE AND AMPHETAMINE SULFATE 5; 5; 5; 5 MG/1; MG/1; MG/1; MG/1
20 TABLET ORAL
Qty: 60 TABLET | Refills: 0 | Status: SHIPPED | OUTPATIENT
Start: 2024-10-18

## 2024-10-21 ENCOUNTER — TELEPHONE (OUTPATIENT)
Dept: PSYCHIATRY | Facility: CLINIC | Age: 44
End: 2024-10-21

## 2024-10-21 NOTE — TELEPHONE ENCOUNTER
HI just an MANINDERI   ----- Message -----   From: Gautam, Generic   Sent: 10/21/2024  12:05 PM EDT   To: [facility]   Subject: Appointment canceled                              Appointment canceled for Margarita Park ([id number])   Visit type: MEDICATION MANAGEMENT [facility]   10/28/2024 2:30 PM (30 minutes) with Scot Wong DO in [facility]      Reason for cancellation: Canceled via [employer]

## 2024-11-15 ENCOUNTER — OFFICE VISIT (OUTPATIENT)
Dept: PSYCHIATRY | Facility: CLINIC | Age: 44
End: 2024-11-15
Payer: COMMERCIAL

## 2024-11-15 VITALS — HEART RATE: 116 BPM | SYSTOLIC BLOOD PRESSURE: 117 MMHG | DIASTOLIC BLOOD PRESSURE: 77 MMHG

## 2024-11-15 DIAGNOSIS — F41.1 GENERALIZED ANXIETY DISORDER: ICD-10-CM

## 2024-11-15 DIAGNOSIS — F32.5 MAJOR DEPRESSIVE DISORDER WITH SINGLE EPISODE, IN FULL REMISSION (HCC): ICD-10-CM

## 2024-11-15 DIAGNOSIS — F90.2 ATTENTION DEFICIT HYPERACTIVITY DISORDER (ADHD), COMBINED TYPE: ICD-10-CM

## 2024-11-15 PROCEDURE — 99213 OFFICE O/P EST LOW 20 MIN: CPT

## 2024-11-15 RX ORDER — DEXTROAMPHETAMINE SACCHARATE, AMPHETAMINE ASPARTATE, DEXTROAMPHETAMINE SULFATE AND AMPHETAMINE SULFATE 5; 5; 5; 5 MG/1; MG/1; MG/1; MG/1
20 TABLET ORAL
Qty: 60 TABLET | Refills: 0 | Status: SHIPPED | OUTPATIENT
Start: 2024-11-15

## 2024-11-15 RX ORDER — ESCITALOPRAM OXALATE 10 MG/1
10 TABLET ORAL DAILY
Qty: 90 TABLET | Refills: 0 | Status: SHIPPED | OUTPATIENT
Start: 2024-11-15 | End: 2025-02-13

## 2024-11-15 NOTE — ASSESSMENT & PLAN NOTE
Lexapro 10 mg daily for anxiety symptoms.  PARQ completed including serotonin syndrome, SIADH, worsening depression, suicidality, induction of juan r, GI upset, headaches, activation, sexual side effects, sedation, potential drug interactions, and others.

## 2024-11-15 NOTE — ASSESSMENT & PLAN NOTE
Adderall 20 mg twice daily for ADHD symptoms.  PDMP reviewed, refills are appropriate and on time.  In office blood pressure of 117/77, slight tachycardia at 116 bpm, patient reports baseline around 100 bpm.  PARQ completed including elevated heart rate, elevated bp, seizures, anxiety/irritability, activation/induction of juan r, abuse potential, interactions with other medications, risk of sudden death, appetite suppression/weight loss and other risks. For MALES- rare priapism.
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
## 2024-12-12 DIAGNOSIS — F90.2 ATTENTION DEFICIT HYPERACTIVITY DISORDER (ADHD), COMBINED TYPE: ICD-10-CM

## 2024-12-12 RX ORDER — DEXTROAMPHETAMINE SACCHARATE, AMPHETAMINE ASPARTATE, DEXTROAMPHETAMINE SULFATE AND AMPHETAMINE SULFATE 5; 5; 5; 5 MG/1; MG/1; MG/1; MG/1
20 TABLET ORAL
Qty: 60 TABLET | Refills: 0 | Status: SHIPPED | OUTPATIENT
Start: 2024-12-12 | End: 2025-01-11

## 2024-12-12 NOTE — TELEPHONE ENCOUNTER
Medication Refill Request     Name of Medication amphetamine-dextroamphetamine (ADDERALL, 20MG,) 20 mg tablet   Dose/Frequency Take 1 tablet (20 mg total) by mouth 2 (two) times a day Max Daily Amount: 40 mg   Quantity 60  Verified pharmacy   [x]  Verified ordering Provider   [x]  Does patient have enough for the next 3 days? Yes [] No [x]  Does patient have a follow-up appointment scheduled? Yes [x] No []   If so when is appointment: 2/17/25

## 2024-12-12 NOTE — TELEPHONE ENCOUNTER
Medication:  PDMP  11/15/2024 11/15/2024 Amphetamine Salt Combo (Tablet) 60.0 30 20 MG NA PETER [employer] Commercial Insurance 0 / 0 [place]   1 [id number] 10/19/2024 10/18/2024 Amphetamine Salt Combo (Tablet) 60.0 30 20 MG NA [employer] Commercial Insurance 0 / 0 PA   1 [id number] 09/20/2024 09/20/2024 Amphetamine Salt Combo (Tablet) 60.0 30 20 MG NA [employer]ER [employer] Commercial Insurance 0 / 0  Active agreement on file -

## 2025-01-09 DIAGNOSIS — F90.2 ATTENTION DEFICIT HYPERACTIVITY DISORDER (ADHD), COMBINED TYPE: ICD-10-CM

## 2025-01-09 NOTE — TELEPHONE ENCOUNTER
Patient calling to request a refill on Adderall 20mg as she is going away and do not have enough on hand.

## 2025-01-10 RX ORDER — DEXTROAMPHETAMINE SACCHARATE, AMPHETAMINE ASPARTATE, DEXTROAMPHETAMINE SULFATE AND AMPHETAMINE SULFATE 5; 5; 5; 5 MG/1; MG/1; MG/1; MG/1
20 TABLET ORAL
Qty: 60 TABLET | Refills: 0 | Status: SHIPPED | OUTPATIENT
Start: 2025-01-10 | End: 2025-02-09

## 2025-01-10 NOTE — TELEPHONE ENCOUNTER
Patient called to check status of this refill she will be completely out of medication before Monday     Please review

## 2025-02-06 DIAGNOSIS — F90.2 ATTENTION DEFICIT HYPERACTIVITY DISORDER (ADHD), COMBINED TYPE: ICD-10-CM

## 2025-02-06 NOTE — TELEPHONE ENCOUNTER
Medication:  PDMP  01/10/2025 01/10/2025 Amphetamine Salt Combo (Tablet) 60.0 30 20 MG NA PETER [facility] Private Pay 0 / 0 [place]   1 [id number] 12/14/2024 12/12/2024 Amphetamine Salt Combo (Tablet) 60.0 30 20 MG NA PETER CLONEY [facility] [employer] Commercial Insurance 0 / 0 PA   1 [id number] 11/15/2024 11/15/2024 Amphetamine Salt Combo (Tablet) 60.0 30 20 MG NA PETER [facility] [employer] Commercial Insurance 0 / 0  Active agreement on file -

## 2025-02-06 NOTE — TELEPHONE ENCOUNTER
Medication Refill Request     Name of Medication Adderall   Dose/Frequency 20 mg  Quantity 60  Verified pharmacy   [x]  Verified ordering Provider   [x]  Does patient have enough for the next 3 days? Yes [] No [x]  Does patient have a follow-up appointment scheduled? Yes [x] No []   If so when is appointment: 2/17/2025 at 10:30 am.

## 2025-02-07 RX ORDER — DEXTROAMPHETAMINE SACCHARATE, AMPHETAMINE ASPARTATE, DEXTROAMPHETAMINE SULFATE AND AMPHETAMINE SULFATE 5; 5; 5; 5 MG/1; MG/1; MG/1; MG/1
20 TABLET ORAL
Qty: 60 TABLET | Refills: 0 | Status: SHIPPED | OUTPATIENT
Start: 2025-02-07 | End: 2025-03-09

## 2025-02-17 ENCOUNTER — OFFICE VISIT (OUTPATIENT)
Dept: PSYCHIATRY | Facility: CLINIC | Age: 45
End: 2025-02-17
Payer: COMMERCIAL

## 2025-02-17 VITALS — SYSTOLIC BLOOD PRESSURE: 122 MMHG | DIASTOLIC BLOOD PRESSURE: 79 MMHG | HEART RATE: 82 BPM

## 2025-02-17 DIAGNOSIS — F32.5 MAJOR DEPRESSIVE DISORDER WITH SINGLE EPISODE, IN FULL REMISSION (HCC): ICD-10-CM

## 2025-02-17 DIAGNOSIS — F41.1 GENERALIZED ANXIETY DISORDER: ICD-10-CM

## 2025-02-17 DIAGNOSIS — F90.2 ATTENTION DEFICIT HYPERACTIVITY DISORDER (ADHD), COMBINED TYPE: Primary | ICD-10-CM

## 2025-02-17 PROCEDURE — 99213 OFFICE O/P EST LOW 20 MIN: CPT

## 2025-02-17 NOTE — ASSESSMENT & PLAN NOTE
Adderall 20 mg twice daily for ADHD symptoms.  PDMP reviewed.  In office blood pressure noted to be 122/79, pulse of 82 bpm.  PARQ completed including elevated heart rate, elevated bp, seizures, anxiety/irritability, activation/induction of juan r, abuse potential, interactions with other medications, risk of sudden death, appetite suppression/weight loss and other risks.
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
## 2025-02-19 NOTE — BH TREATMENT PLAN
TREATMENT PLAN - Medication Management        [facility]    Name and Date of Birth:  Margarita Park 44 y.o. 1980  Date of Treatment Plan: February 19, 2025  Diagnosis/Diagnoses:    1. Attention deficit hyperactivity disorder (ADHD), combined type    2. Generalized anxiety disorder    3. Major depressive disorder with single episode, in full remission (HCC)        Strengths/Personal Resources for Self-Care: supportive family, supportive friends, taking medications as prescribed, ability to adapt to life changes, ability to communicate well, ability to understand psychiatric illness, average or above intelligence, financial means, good physical health, good understanding of illness, independence, motivation for treatment, self-reliance, sense of humor, stable employment, well educated, work skills    Area/Areas of need: ADHD symptoms    Long Term Goal: maintain control of ADHD symptoms  Target Date: 6 months - August 19, 2025  Person/Persons responsible for completion of goal: Margarita and Scot Wong DO     Short Term Objective (s) - How will we reach this goal?:   Take medications as prescribed  Attend psychiatry appointments regularly  Target Date: 3 months - May 19, 2025  Person/Persons Responsible for Completion of Goal: Margarita     Progress Towards Goals: Continuing treatment    Treatment Modality: medication management every 1-3 months as needed  Review due 180 days from date of this plan: August 18, 2025   Expected length of service: Ongoing treatment    My physician and I have developed this plan together, and I agree to work on the goals and objectives. I understand the treatment goals that were developed for my treatment.    The treatment plan was created between Scot Wong DO and Margarita Park on 02/19/25 at 10:40 AM.

## 2025-03-06 DIAGNOSIS — F32.5 MAJOR DEPRESSIVE DISORDER WITH SINGLE EPISODE, IN FULL REMISSION (HCC): ICD-10-CM

## 2025-03-06 DIAGNOSIS — F41.1 GENERALIZED ANXIETY DISORDER: ICD-10-CM

## 2025-03-06 DIAGNOSIS — F90.2 ATTENTION DEFICIT HYPERACTIVITY DISORDER (ADHD), COMBINED TYPE: ICD-10-CM

## 2025-03-06 RX ORDER — ESCITALOPRAM OXALATE 10 MG/1
10 TABLET ORAL DAILY
Qty: 90 TABLET | Refills: 0 | Status: SHIPPED | OUTPATIENT
Start: 2025-03-06 | End: 2025-06-04

## 2025-03-06 NOTE — TELEPHONE ENCOUNTER
02/07/2025 02/07/2025 Amphetamine Salt Combo (Tablet) 60.0 60 20 MG NA PETER CLONEY [facility] Private Pay 0 / 0 [place]      1 [id number] 01/10/2025 01/10/2025 Amphetamine Salt Combo (Tablet) 60.0 30 20 MG NA [facility] CLONEY [facility] Private Pay 0 / 0 [place]    1 [id number] 12/14/2024 12/12/2024 Amphetamine Salt Combo (Tablet) 60.0 30 20 MG NA PETER CLONEY [facility].

## 2025-03-06 NOTE — TELEPHONE ENCOUNTER
Reason for call:   [x] Refill   [] Prior Auth  [] Other:     Office:   [] PCP/Provider -   [x] Specialty/Provider -     Medication: escitalopram (LEXAPRO) 10 mg tablet     Dose/Frequency:  Take 1 tablet (10 mg total) by mouth daily,     Quantity: 90 tablet     Medication: amphetamine-dextroamphetamine (ADDERALL, 20MG,) 20 mg tablet     Dose/Frequency:  Take 1 tablet (20 mg total) by mouth 2 (two) times a day     Quantity: 60 tablet     Pharmacy: [facility]pharm[facility] #[id number] - [place], [place] - [address]   Does the patient have enough for 3 days?   [] Yes   [x] No - Send as HP to POD

## 2025-03-07 NOTE — TELEPHONE ENCOUNTER
Patient called to request a refill for their Adderall 20 mg  advised a refill was requested on 03/06/24 and is pending approval. Patient verbalized understanding and is in agreement.     Does the patient have enough for 3 days?   [] Yes   [x] No - Send as HP to POD

## 2025-03-08 DIAGNOSIS — L70.0 ACNE VULGARIS: ICD-10-CM

## 2025-03-10 RX ORDER — DEXTROAMPHETAMINE SACCHARATE, AMPHETAMINE ASPARTATE, DEXTROAMPHETAMINE SULFATE AND AMPHETAMINE SULFATE 5; 5; 5; 5 MG/1; MG/1; MG/1; MG/1
20 TABLET ORAL
Qty: 60 TABLET | Refills: 0 | Status: SHIPPED | OUTPATIENT
Start: 2025-03-10 | End: 2025-04-09

## 2025-03-12 ENCOUNTER — TELEPHONE (OUTPATIENT)
Age: 45
End: 2025-03-12

## 2025-03-12 RX ORDER — TRETINOIN 0.5 MG/G
CREAM TOPICAL
Qty: 45 G | Refills: 0 | Status: SHIPPED | OUTPATIENT
Start: 2025-03-12

## 2025-03-12 NOTE — TELEPHONE ENCOUNTER
[place] tretinoin (Retin-A) 0.05 % APPROVED         Patient advised by          [x]MyChart Message  []Phone call   []LMOM  []L/M to call office as no active Communication consent on file  []Unable to leave detailed message as VM not approved on Communication consent       Pharmacy advised by    [x]Fax  []Phone call  []Secure Chat    Specialty Pharmacy    []

## 2025-03-12 NOTE — TELEPHONE ENCOUNTER
PA tretinoin (Retin-A) 0.05 % SUBMITTED     to [employer]     via    []CMM-KEY:    [x]Surescripts-Case ID # [id number]   []Availity-Auth ID #  NDC #    []Faxed to plan   []Other website    []Phone call Case ID #      []PA sent as URGENT    All office notes, labs and other pertaining documents and studies sent. Clinical questions answered. Awaiting determination from insurance company.     Turnaround time for your insurance to make a decision on your Prior Authorization can take 7-21 business days.

## 2025-03-13 DIAGNOSIS — Z13.220 SCREENING, LIPID: Primary | ICD-10-CM

## 2025-03-13 DIAGNOSIS — Z13.1 SCREENING FOR DIABETES MELLITUS: ICD-10-CM

## 2025-03-13 NOTE — TELEPHONE ENCOUNTER
Appt scheduled for 7/22 at [facility]  Patient was asking for lab orders to be placed in because she was unable to get them previously.    Thank you

## 2025-04-06 DIAGNOSIS — F32.5 MAJOR DEPRESSIVE DISORDER WITH SINGLE EPISODE, IN FULL REMISSION (HCC): ICD-10-CM

## 2025-04-06 DIAGNOSIS — F41.1 GENERALIZED ANXIETY DISORDER: ICD-10-CM

## 2025-04-07 DIAGNOSIS — F90.2 ATTENTION DEFICIT HYPERACTIVITY DISORDER (ADHD), COMBINED TYPE: ICD-10-CM

## 2025-04-07 DIAGNOSIS — F32.5 MAJOR DEPRESSIVE DISORDER WITH SINGLE EPISODE, IN FULL REMISSION (HCC): ICD-10-CM

## 2025-04-07 DIAGNOSIS — F41.1 GENERALIZED ANXIETY DISORDER: ICD-10-CM

## 2025-04-07 NOTE — TELEPHONE ENCOUNTER
----- Message from Judith ESCAMILLA sent at 4/7/2025  2:12 PM EDT -----  No, I tried to call the resident line and no luck.  ----- Message -----  From: Yaneth Asif  Sent: 4/7/2025   2:12 PM EDT  To: Judith Mitchell; Scot Wong, DO    Was there a medication request done for provider?  ----- Message -----  From: Judith Mitchell  Sent: 4/7/2025  12:13 PM EDT  To: Yaneth Asif; Scot Wong, DO    Pt called for med refill Adderall  for [facility]

## 2025-04-08 RX ORDER — ESCITALOPRAM OXALATE 10 MG/1
10 TABLET ORAL DAILY
Qty: 90 TABLET | Refills: 0 | Status: SHIPPED | OUTPATIENT
Start: 2025-04-08 | End: 2025-04-08 | Stop reason: SDUPTHER

## 2025-04-08 RX ORDER — ESCITALOPRAM OXALATE 10 MG/1
10 TABLET ORAL DAILY
Qty: 90 TABLET | Refills: 0 | Status: SHIPPED | OUTPATIENT
Start: 2025-04-08

## 2025-04-08 RX ORDER — DEXTROAMPHETAMINE SACCHARATE, AMPHETAMINE ASPARTATE, DEXTROAMPHETAMINE SULFATE AND AMPHETAMINE SULFATE 5; 5; 5; 5 MG/1; MG/1; MG/1; MG/1
20 TABLET ORAL
Qty: 60 TABLET | Refills: 0 | Status: SHIPPED | OUTPATIENT
Start: 2025-04-08 | End: 2025-04-08

## 2025-04-08 RX ORDER — ESCITALOPRAM OXALATE 10 MG/1
10 TABLET ORAL DAILY
Qty: 90 TABLET | Refills: 0 | Status: SHIPPED | OUTPATIENT
Start: 2025-04-08 | End: 2025-04-08

## 2025-04-08 RX ORDER — DEXTROAMPHETAMINE SACCHARATE, AMPHETAMINE ASPARTATE, DEXTROAMPHETAMINE SULFATE AND AMPHETAMINE SULFATE 5; 5; 5; 5 MG/1; MG/1; MG/1; MG/1
20 TABLET ORAL
Qty: 60 TABLET | Refills: 0 | Status: SHIPPED | OUTPATIENT
Start: 2025-04-08 | End: 2025-05-08

## 2025-04-08 NOTE — TELEPHONE ENCOUNTER
Patient called to check status of medication, made patient aware it is pending approval from provider. Patient needs medication filled today due to her work schedule.

## 2025-04-08 NOTE — TELEPHONE ENCOUNTER
Patient called to advise that script was sent to the wrong pharmacy.      Patient needs script for Adderall 20mg sent to [id number] - [place], [place] - [address]     Please review and contact patient if any issues arise with transferring script to correct pharmacy. Patient was advised to follow up with the pharmacy in 24 hours.

## 2025-05-05 DIAGNOSIS — F90.2 ATTENTION DEFICIT HYPERACTIVITY DISORDER (ADHD), COMBINED TYPE: ICD-10-CM

## 2025-05-05 NOTE — TELEPHONE ENCOUNTER
Medication Refill Request     Name of Medication Adderrall  Dose/Frequency 20mg  Quantity 60 tab  Verified pharmacy   [x]  Verified ordering Provider   [x]  Does patient have enough for the next 3 days? Yes [] No [x]  Does patient have a follow-up appointment scheduled? Yes [x] No []   If so when is appointment: 5/12 at 10:30a

## 2025-05-05 NOTE — TELEPHONE ENCOUNTER
04/08/2025 04/08/2025 Amphetamine Salt Combo (Tablet) 60.0 30 20 MG NA PETER [facility] Commercial [facility] 0 / 0 [place]   1 [id number] ** 03/11/2025 03/10/2025 Amphetamine Salt Combo (Tablet) 60.0 30 20 MG NA PETER [facility], [facility]. Commercial Insurance 0 / 0 [place]   1 [id number] ** 02/07/2025 02/07/2025 Amphetamine Salt Combo (Tablet) 60.0 60 20 MG NA LISETTE [facility], [facility]. Private Pay 0 / 0 [place]   1 [id number] ** 01/10/2025 01/10/2025 Amphetamine Salt Combo (Tablet) 60.0 30 20 MG NA PETER [facility], [facility]. Private Pay 0 / 0 [place]   1 [id number] 12/14/2024 12/14/2024 12/12/2024 Amphetamine Salt Combo (Tablet) 60.0 30 20 MG NA PETER [facility] Commercial Insurance 0 / 0 [place]   1 [id number] 11/16/2024 11/15/2024 11/15/2024 Amphetamine Salt Combo (Tablet) 60.0 30 20 MG NA PETER [facility]. Commercial Insurance 0 / 0 [place]   1 [id number] 10/19/2024 10/19/2024 10/18/2024 Amphetamine Salt Combo (Tablet) 60.0 30 20 MG NA PETER [facility] Commercial Insurance 0 / 0 [place]   1 [id number] 09/21/2024 09/20/2024 09/20/2024 Amphetamine Salt Combo (Tablet) 60.0

## 2025-05-06 RX ORDER — DEXTROAMPHETAMINE SACCHARATE, AMPHETAMINE ASPARTATE, DEXTROAMPHETAMINE SULFATE AND AMPHETAMINE SULFATE 5; 5; 5; 5 MG/1; MG/1; MG/1; MG/1
20 TABLET ORAL
Qty: 60 TABLET | Refills: 0 | Status: SHIPPED | OUTPATIENT
Start: 2025-05-06 | End: 2025-06-05

## 2025-05-12 ENCOUNTER — OFFICE VISIT (OUTPATIENT)
Dept: PSYCHIATRY | Facility: CLINIC | Age: 45
End: 2025-05-12
Payer: COMMERCIAL

## 2025-05-12 DIAGNOSIS — F32.5 MAJOR DEPRESSIVE DISORDER WITH SINGLE EPISODE, IN FULL REMISSION (HCC): ICD-10-CM

## 2025-05-12 DIAGNOSIS — F41.1 GENERALIZED ANXIETY DISORDER: ICD-10-CM

## 2025-05-12 DIAGNOSIS — F90.2 ATTENTION DEFICIT HYPERACTIVITY DISORDER (ADHD), COMBINED TYPE: Primary | ICD-10-CM

## 2025-05-12 PROCEDURE — 99213 OFFICE O/P EST LOW 20 MIN: CPT

## 2025-05-12 RX ORDER — ESCITALOPRAM OXALATE 10 MG/1
10 TABLET ORAL DAILY
Qty: 90 TABLET | Refills: 1 | Status: SHIPPED | OUTPATIENT
Start: 2025-05-12

## 2025-05-12 NOTE — ASSESSMENT & PLAN NOTE
Adderall 20 mg twice daily for ADHD symptoms.  PDMP reviewed.  PARQ completed including elevated heart rate, elevated bp, seizures, anxiety/irritability, activation/induction of juan r, abuse potential, interactions with other medications, risk of sudden death, appetite suppression/weight loss and other risks.

## 2025-05-12 NOTE — BH CRISIS PLAN
Client Name: Margarita Park       Client YOB: 1980    Fred[facility]Antwan Safety Plan      Creation Date: 5/12/25 Update Date: 5/12/25   Created By: Scot Wong DO Last Updated By: Scot Wong DO      Step 1: Warning Signs:   Warning Signs   Feeling annoyed with patients   isolation            Step 2: Internal Coping Strategies:   Internal Coping Strategies   Distraction - Reading, TV, exercise   Debriefing with co-workers            Step 3: People and social settings that provide distraction:   Name Contact Information    - Significant other [phone]            Step 4: People whom I can ask for help during a crisis:      Name Contact Information     - Significant other      [phone]      Step 5: Professionals or agencies I can contact during a crisis:      Clinican/Agency Name Phone Emergency Contact    Scot Wong D.O. [phone]       Local Emergency Department Emergency Department Phone Emergency Department Address    Local          Crisis Phone Numbers:   Suicide Prevention Lifeline: Call or Text  376 Crisis Text Line: Text HOME to 910-961   Please note: Some [facility] do not have a separate number for Child/Adolescent specific crisis. If your county is not listed under Child/Adolescent, please call the adult number for your county      Adult Crisis Numbers: Child/Adolescent Crisis Numbers   [facility]: [phone] [facility]: [phone]   [facility]: [phone] [facility]: [phone]   [facility]: [phone] [place]: [phone]   [facility]: [phone] [place]: [phone]   [facility]: [phone]   [facility]: [phone]   [facility]: [phone]   [facility]: [phone] (daytime) [phone] (after hours, weekends, holidays)      Step 6: Making the environment safer (plan for lethal means safety):   Patient did not identify any lethal methods:  Yes     Optional: What is most important to me and worth living for?      Rajiv Safety Plan. Heather Ibarra and Pawel Moncada. Used with permission of the authors.
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
## 2025-05-12 NOTE — ASSESSMENT & PLAN NOTE
Lexapro 10 mg daily for anxiety symptoms.  PARQ completed including serotonin syndrome, SIADH, worsening depression, suicidality, induction of juan r, GI upset, headaches, activation, sexual side effects, sedation, potential drug interactions, and others.  Orders:    escitalopram (LEXAPRO) 10 mg tablet; Take 1 tablet (10 mg total) by mouth daily

## 2025-05-25 DIAGNOSIS — L70.0 ACNE VULGARIS: ICD-10-CM

## 2025-05-27 RX ORDER — TRETINOIN 0.5 MG/G
CREAM TOPICAL
Qty: 45 G | Refills: 0 | Status: SHIPPED | OUTPATIENT
Start: 2025-05-27

## 2025-06-02 DIAGNOSIS — F90.2 ATTENTION DEFICIT HYPERACTIVITY DISORDER (ADHD), COMBINED TYPE: ICD-10-CM

## 2025-06-02 NOTE — TELEPHONE ENCOUNTER
Medication Refill Request     Name of Medication amphetamine-dextroamphetamine (ADDERALL, 20MG,) 20 mg tablet   Dose/Frequency Take 1 tablet (20 mg total) by mouth 2 (two) times a day Max Daily Amount: 40 mg   Quantity 60  Verified pharmacy   [x]  Verified ordering Provider   [x]  Does patient have enough for the next 3 days? Yes [] No [x]  Does patient have a follow-up appointment scheduled? Yes [] No [x]   If so when is appointment:

## 2025-06-03 RX ORDER — DEXTROAMPHETAMINE SACCHARATE, AMPHETAMINE ASPARTATE, DEXTROAMPHETAMINE SULFATE AND AMPHETAMINE SULFATE 5; 5; 5; 5 MG/1; MG/1; MG/1; MG/1
20 TABLET ORAL
Qty: 60 TABLET | Refills: 0 | Status: SHIPPED | OUTPATIENT
Start: 2025-06-03 | End: 2025-07-03

## 2025-06-03 NOTE — TELEPHONE ENCOUNTER
Refill must be reviewed and completed by the office or provider. The refill is unable to be approved or denied by the medication management team.      05/06/2025 05/06/2025 Amphetamine Salt Combo (Tablet) 60.0 30 20 MG NA [facility] Commercial Insurance 0 / 0 [place]   1 [id number] ** 04/08/2025 04/08/2025 Amphetamine Salt Combo (Tablet) 60.0 30 20 MG NA [facility] Commercial Insurance 0 / 0 [place]   1 [id number] ** 03/11/2025 03/10/2025 Amphetamine Salt Combo (Tablet) 60.0 30 20 MG NA [facility] Commercial Insurance 0 / 0 [place]

## 2025-07-03 DIAGNOSIS — F90.2 ATTENTION DEFICIT HYPERACTIVITY DISORDER (ADHD), COMBINED TYPE: ICD-10-CM

## 2025-07-03 NOTE — TELEPHONE ENCOUNTER
Medication Refill Request     Name of Medication Adderall   Dose/Frequency 20mg, Take 1 tablet (20 mg total) by mouth 2 (two) times a day   Quantity 360 tablet  Verified pharmacy   [x]  Verified ordering Provider   [x]  Does patient have enough for the next 3 days? Yes [] No [x]  Does patient have a follow-up appointment scheduled? Yes [] No [x]   If so when is appointment:

## 2025-07-07 RX ORDER — DEXTROAMPHETAMINE SACCHARATE, AMPHETAMINE ASPARTATE, DEXTROAMPHETAMINE SULFATE AND AMPHETAMINE SULFATE 5; 5; 5; 5 MG/1; MG/1; MG/1; MG/1
20 TABLET ORAL
Qty: 60 TABLET | Refills: 0 | Status: SHIPPED | OUTPATIENT
Start: 2025-07-07 | End: 2025-08-06

## 2025-07-07 NOTE — TELEPHONE ENCOUNTER
Patient called to check status of request, advised in process pending provider approval  Confirmed [facility], she is out of meds.

## 2025-08-04 DIAGNOSIS — F90.2 ATTENTION DEFICIT HYPERACTIVITY DISORDER (ADHD), COMBINED TYPE: ICD-10-CM

## 2025-08-04 RX ORDER — DEXTROAMPHETAMINE SACCHARATE, AMPHETAMINE ASPARTATE, DEXTROAMPHETAMINE SULFATE AND AMPHETAMINE SULFATE 5; 5; 5; 5 MG/1; MG/1; MG/1; MG/1
20 TABLET ORAL
Qty: 60 TABLET | Refills: 0 | Status: SHIPPED | OUTPATIENT
Start: 2025-09-03 | End: 2025-10-03

## 2025-08-04 RX ORDER — DEXTROAMPHETAMINE SACCHARATE, AMPHETAMINE ASPARTATE, DEXTROAMPHETAMINE SULFATE AND AMPHETAMINE SULFATE 5; 5; 5; 5 MG/1; MG/1; MG/1; MG/1
20 TABLET ORAL
Qty: 60 TABLET | Refills: 0 | Status: SHIPPED | OUTPATIENT
Start: 2025-10-03 | End: 2025-11-02

## 2025-08-04 RX ORDER — DEXTROAMPHETAMINE SACCHARATE, AMPHETAMINE ASPARTATE, DEXTROAMPHETAMINE SULFATE AND AMPHETAMINE SULFATE 5; 5; 5; 5 MG/1; MG/1; MG/1; MG/1
20 TABLET ORAL
Qty: 60 TABLET | Refills: 0 | Status: SHIPPED | OUTPATIENT
Start: 2025-08-04 | End: 2025-09-03